# Patient Record
Sex: FEMALE | Race: BLACK OR AFRICAN AMERICAN | Employment: FULL TIME | URBAN - METROPOLITAN AREA
[De-identification: names, ages, dates, MRNs, and addresses within clinical notes are randomized per-mention and may not be internally consistent; named-entity substitution may affect disease eponyms.]

---

## 2017-02-22 ENCOUNTER — OFFICE VISIT (OUTPATIENT)
Dept: CARDIOLOGY CLINIC | Age: 47
End: 2017-02-22

## 2017-02-22 VITALS
WEIGHT: 293 LBS | HEIGHT: 66 IN | DIASTOLIC BLOOD PRESSURE: 78 MMHG | SYSTOLIC BLOOD PRESSURE: 120 MMHG | OXYGEN SATURATION: 97 % | BODY MASS INDEX: 47.09 KG/M2 | HEART RATE: 74 BPM

## 2017-02-22 DIAGNOSIS — I50.22 SYSTOLIC CHF, CHRONIC (HCC): Primary | ICD-10-CM

## 2017-02-22 DIAGNOSIS — I11.0 CARDIOMYOPATHY DUE TO HYPERTENSION, WITH HEART FAILURE (HCC): ICD-10-CM

## 2017-02-22 DIAGNOSIS — I43 CARDIOMYOPATHY DUE TO HYPERTENSION, WITH HEART FAILURE (HCC): ICD-10-CM

## 2017-02-22 DIAGNOSIS — I16.0 HYPERTENSIVE URGENCY: ICD-10-CM

## 2017-02-22 PROCEDURE — 99214 OFFICE O/P EST MOD 30 MIN: CPT | Performed by: INTERNAL MEDICINE

## 2017-02-22 RX ORDER — SPIRONOLACTONE 25 MG/1
TABLET ORAL
Qty: 90 TABLET | Refills: 11 | Status: ON HOLD | OUTPATIENT
Start: 2017-02-22 | End: 2019-01-26 | Stop reason: HOSPADM

## 2017-02-22 RX ORDER — CARVEDILOL 25 MG/1
TABLET ORAL
Qty: 60 TABLET | Refills: 11 | Status: ON HOLD | OUTPATIENT
Start: 2017-02-22 | End: 2019-01-26 | Stop reason: HOSPADM

## 2017-02-22 RX ORDER — FUROSEMIDE 40 MG/1
40 TABLET ORAL 2 TIMES DAILY
Qty: 60 TABLET | Refills: 11 | Status: ON HOLD | OUTPATIENT
Start: 2017-02-22 | End: 2019-01-26 | Stop reason: HOSPADM

## 2017-02-22 RX ORDER — VALSARTAN 320 MG/1
TABLET ORAL
Qty: 30 TABLET | Refills: 11 | Status: ON HOLD | OUTPATIENT
Start: 2017-02-22 | End: 2019-01-26 | Stop reason: HOSPADM

## 2017-07-25 RX ORDER — FUROSEMIDE 40 MG/1
TABLET ORAL
Qty: 60 TABLET | Refills: 0 | OUTPATIENT
Start: 2017-07-25

## 2017-08-22 RX ORDER — SPIRONOLACTONE 25 MG/1
TABLET ORAL
Qty: 90 TABLET | Refills: 0 | OUTPATIENT
Start: 2017-08-22

## 2019-01-21 ENCOUNTER — APPOINTMENT (OUTPATIENT)
Dept: GENERAL RADIOLOGY | Age: 49
DRG: 194 | End: 2019-01-21
Payer: COMMERCIAL

## 2019-01-21 ENCOUNTER — HOSPITAL ENCOUNTER (INPATIENT)
Age: 49
LOS: 7 days | Discharge: HOME HEALTH | DRG: 194 | End: 2019-01-28
Attending: EMERGENCY MEDICINE | Admitting: INTERNAL MEDICINE
Payer: COMMERCIAL

## 2019-01-21 DIAGNOSIS — I50.23 ACUTE ON CHRONIC SYSTOLIC CONGESTIVE HEART FAILURE (HCC): ICD-10-CM

## 2019-01-21 DIAGNOSIS — J96.01 ACUTE RESPIRATORY FAILURE WITH HYPOXEMIA (HCC): Primary | ICD-10-CM

## 2019-01-21 DIAGNOSIS — I16.0 HYPERTENSIVE URGENCY: ICD-10-CM

## 2019-01-21 DIAGNOSIS — E66.01 MORBID OBESITY (HCC): Chronic | ICD-10-CM

## 2019-01-21 DIAGNOSIS — R77.8 ELEVATED TROPONIN: ICD-10-CM

## 2019-01-21 DIAGNOSIS — I50.43 ACUTE ON CHRONIC COMBINED SYSTOLIC AND DIASTOLIC HEART FAILURE (HCC): ICD-10-CM

## 2019-01-21 DIAGNOSIS — I10 HYPERTENSION, ESSENTIAL: Chronic | ICD-10-CM

## 2019-01-21 PROBLEM — I50.33 ACUTE ON CHRONIC DIASTOLIC (CONGESTIVE) HEART FAILURE (HCC): Status: ACTIVE | Noted: 2019-01-21

## 2019-01-21 LAB
A/G RATIO: 1.2 (ref 1.1–2.2)
ALBUMIN SERPL-MCNC: 3.8 G/DL (ref 3.4–5)
ALP BLD-CCNC: 73 U/L (ref 40–129)
ALT SERPL-CCNC: 14 U/L (ref 10–40)
ANION GAP SERPL CALCULATED.3IONS-SCNC: 9 MMOL/L (ref 3–16)
AST SERPL-CCNC: 19 U/L (ref 15–37)
BACTERIA: ABNORMAL /HPF
BASOPHILS ABSOLUTE: 0 K/UL (ref 0–0.2)
BASOPHILS RELATIVE PERCENT: 0.2 %
BILIRUB SERPL-MCNC: 0.6 MG/DL (ref 0–1)
BILIRUBIN URINE: NEGATIVE
BLOOD, URINE: ABNORMAL
BUN BLDV-MCNC: 16 MG/DL (ref 7–20)
CALCIUM SERPL-MCNC: 8.9 MG/DL (ref 8.3–10.6)
CHLORIDE BLD-SCNC: 102 MMOL/L (ref 99–110)
CLARITY: CLEAR
CO2: 31 MMOL/L (ref 21–32)
COLOR: YELLOW
CREAT SERPL-MCNC: 1.4 MG/DL (ref 0.6–1.1)
EOSINOPHILS ABSOLUTE: 0.1 K/UL (ref 0–0.6)
EOSINOPHILS RELATIVE PERCENT: 1.3 %
EPITHELIAL CELLS, UA: 2 /HPF (ref 0–5)
GFR AFRICAN AMERICAN: 49
GFR NON-AFRICAN AMERICAN: 40
GLOBULIN: 3.3 G/DL
GLUCOSE BLD-MCNC: 111 MG/DL (ref 70–99)
GLUCOSE URINE: NEGATIVE MG/DL
HCG QUALITATIVE: NEGATIVE
HCT VFR BLD CALC: 43.1 % (ref 36–48)
HEMOGLOBIN: 13.1 G/DL (ref 12–16)
HYALINE CASTS: 2 /LPF (ref 0–8)
INR BLD: 1.13 (ref 0.86–1.14)
KETONES, URINE: NEGATIVE MG/DL
LEUKOCYTE ESTERASE, URINE: NEGATIVE
LIPASE: 21 U/L (ref 13–60)
LYMPHOCYTES ABSOLUTE: 2.3 K/UL (ref 1–5.1)
LYMPHOCYTES RELATIVE PERCENT: 25.1 %
MCH RBC QN AUTO: 26.1 PG (ref 26–34)
MCHC RBC AUTO-ENTMCNC: 30.4 G/DL (ref 31–36)
MCV RBC AUTO: 85.8 FL (ref 80–100)
MICROSCOPIC EXAMINATION: YES
MONOCYTES ABSOLUTE: 0.6 K/UL (ref 0–1.3)
MONOCYTES RELATIVE PERCENT: 6.7 %
NEUTROPHILS ABSOLUTE: 6.1 K/UL (ref 1.7–7.7)
NEUTROPHILS RELATIVE PERCENT: 66.7 %
NITRITE, URINE: NEGATIVE
PDW BLD-RTO: 17.6 % (ref 12.4–15.4)
PH UA: 6
PLATELET # BLD: 331 K/UL (ref 135–450)
PMV BLD AUTO: 7.7 FL (ref 5–10.5)
POTASSIUM REFLEX MAGNESIUM: 3.6 MMOL/L (ref 3.5–5.1)
PRO-BNP: 2774 PG/ML (ref 0–124)
PROTEIN UA: 100 MG/DL
PROTHROMBIN TIME: 12.9 SEC (ref 9.8–13)
RBC # BLD: 5.02 M/UL (ref 4–5.2)
RBC UA: 4 /HPF (ref 0–4)
SODIUM BLD-SCNC: 142 MMOL/L (ref 136–145)
SPECIFIC GRAVITY UA: 1.01
TOTAL PROTEIN: 7.1 G/DL (ref 6.4–8.2)
TROPONIN: 0.02 NG/ML
URINE TYPE: ABNORMAL
UROBILINOGEN, URINE: 0.2 E.U./DL
WBC # BLD: 9.2 K/UL (ref 4–11)
WBC UA: 1 /HPF (ref 0–5)

## 2019-01-21 PROCEDURE — 71046 X-RAY EXAM CHEST 2 VIEWS: CPT

## 2019-01-21 PROCEDURE — 84156 ASSAY OF PROTEIN URINE: CPT

## 2019-01-21 PROCEDURE — 2060000000 HC ICU INTERMEDIATE R&B

## 2019-01-21 PROCEDURE — 96374 THER/PROPH/DIAG INJ IV PUSH: CPT

## 2019-01-21 PROCEDURE — 81001 URINALYSIS AUTO W/SCOPE: CPT

## 2019-01-21 PROCEDURE — 85025 COMPLETE CBC W/AUTO DIFF WBC: CPT

## 2019-01-21 PROCEDURE — 93005 ELECTROCARDIOGRAM TRACING: CPT | Performed by: EMERGENCY MEDICINE

## 2019-01-21 PROCEDURE — 99284 EMERGENCY DEPT VISIT MOD MDM: CPT

## 2019-01-21 PROCEDURE — 6360000002 HC RX W HCPCS: Performed by: EMERGENCY MEDICINE

## 2019-01-21 PROCEDURE — 6370000000 HC RX 637 (ALT 250 FOR IP): Performed by: EMERGENCY MEDICINE

## 2019-01-21 PROCEDURE — 85610 PROTHROMBIN TIME: CPT

## 2019-01-21 PROCEDURE — 83880 ASSAY OF NATRIURETIC PEPTIDE: CPT

## 2019-01-21 PROCEDURE — 83690 ASSAY OF LIPASE: CPT

## 2019-01-21 PROCEDURE — 82570 ASSAY OF URINE CREATININE: CPT

## 2019-01-21 PROCEDURE — 80053 COMPREHEN METABOLIC PANEL: CPT

## 2019-01-21 PROCEDURE — 84484 ASSAY OF TROPONIN QUANT: CPT

## 2019-01-21 PROCEDURE — 82043 UR ALBUMIN QUANTITATIVE: CPT

## 2019-01-21 PROCEDURE — 84703 CHORIONIC GONADOTROPIN ASSAY: CPT

## 2019-01-21 RX ORDER — FUROSEMIDE 10 MG/ML
40 INJECTION INTRAMUSCULAR; INTRAVENOUS DAILY
Status: DISCONTINUED | OUTPATIENT
Start: 2019-01-22 | End: 2019-01-22

## 2019-01-21 RX ORDER — SPIRONOLACTONE 25 MG/1
25 TABLET ORAL DAILY
Status: DISCONTINUED | OUTPATIENT
Start: 2019-01-22 | End: 2019-01-22

## 2019-01-21 RX ORDER — ONDANSETRON 2 MG/ML
4 INJECTION INTRAMUSCULAR; INTRAVENOUS EVERY 6 HOURS PRN
Status: DISCONTINUED | OUTPATIENT
Start: 2019-01-21 | End: 2019-01-28 | Stop reason: HOSPADM

## 2019-01-21 RX ORDER — SODIUM CHLORIDE 0.9 % (FLUSH) 0.9 %
10 SYRINGE (ML) INJECTION EVERY 12 HOURS SCHEDULED
Status: DISCONTINUED | OUTPATIENT
Start: 2019-01-21 | End: 2019-01-28 | Stop reason: HOSPADM

## 2019-01-21 RX ORDER — MAGNESIUM SULFATE 1 G/100ML
1 INJECTION INTRAVENOUS PRN
Status: DISCONTINUED | OUTPATIENT
Start: 2019-01-21 | End: 2019-01-28 | Stop reason: HOSPADM

## 2019-01-21 RX ORDER — ASPIRIN 81 MG/1
324 TABLET, CHEWABLE ORAL ONCE
Status: COMPLETED | OUTPATIENT
Start: 2019-01-21 | End: 2019-01-21

## 2019-01-21 RX ORDER — CARVEDILOL 25 MG/1
25 TABLET ORAL 2 TIMES DAILY WITH MEALS
Status: DISCONTINUED | OUTPATIENT
Start: 2019-01-22 | End: 2019-01-22

## 2019-01-21 RX ORDER — SODIUM CHLORIDE 0.9 % (FLUSH) 0.9 %
10 SYRINGE (ML) INJECTION PRN
Status: DISCONTINUED | OUTPATIENT
Start: 2019-01-21 | End: 2019-01-28 | Stop reason: HOSPADM

## 2019-01-21 RX ORDER — NITROGLYCERIN 0.4 MG/1
0.4 TABLET SUBLINGUAL EVERY 5 MIN PRN
Status: DISCONTINUED | OUTPATIENT
Start: 2019-01-21 | End: 2019-01-28 | Stop reason: HOSPADM

## 2019-01-21 RX ORDER — VALSARTAN 160 MG/1
320 TABLET ORAL DAILY
Status: DISCONTINUED | OUTPATIENT
Start: 2019-01-22 | End: 2019-01-22

## 2019-01-21 RX ORDER — POTASSIUM CHLORIDE 7.45 MG/ML
10 INJECTION INTRAVENOUS PRN
Status: DISCONTINUED | OUTPATIENT
Start: 2019-01-21 | End: 2019-01-28 | Stop reason: HOSPADM

## 2019-01-21 RX ORDER — NICOTINE 21 MG/24HR
1 PATCH, TRANSDERMAL 24 HOURS TRANSDERMAL DAILY
Status: DISCONTINUED | OUTPATIENT
Start: 2019-01-22 | End: 2019-01-28 | Stop reason: HOSPADM

## 2019-01-21 RX ORDER — FUROSEMIDE 10 MG/ML
40 INJECTION INTRAMUSCULAR; INTRAVENOUS ONCE
Status: COMPLETED | OUTPATIENT
Start: 2019-01-21 | End: 2019-01-21

## 2019-01-21 RX ADMIN — ASPIRIN 81 MG 324 MG: 81 TABLET ORAL at 21:31

## 2019-01-21 RX ADMIN — FUROSEMIDE 40 MG: 10 INJECTION, SOLUTION INTRAMUSCULAR; INTRAVENOUS at 21:31

## 2019-01-22 PROBLEM — E66.01 MORBID OBESITY (HCC): Chronic | Status: ACTIVE | Noted: 2019-01-22

## 2019-01-22 PROBLEM — M79.89 SWELLING OF RIGHT LOWER EXTREMITY: Status: ACTIVE | Noted: 2019-01-22

## 2019-01-22 PROBLEM — R07.89 CHEST DISCOMFORT: Status: ACTIVE | Noted: 2019-01-22

## 2019-01-22 LAB
ANION GAP SERPL CALCULATED.3IONS-SCNC: 14 MMOL/L (ref 3–16)
BUN BLDV-MCNC: 15 MG/DL (ref 7–20)
CALCIUM SERPL-MCNC: 8.8 MG/DL (ref 8.3–10.6)
CHLORIDE BLD-SCNC: 100 MMOL/L (ref 99–110)
CO2: 25 MMOL/L (ref 21–32)
CREAT SERPL-MCNC: 1.5 MG/DL (ref 0.6–1.1)
CREATININE URINE: 83.5 MG/DL (ref 28–259)
EKG ATRIAL RATE: 102 BPM
EKG DIAGNOSIS: NORMAL
EKG P AXIS: 54 DEGREES
EKG P-R INTERVAL: 144 MS
EKG Q-T INTERVAL: 382 MS
EKG QRS DURATION: 88 MS
EKG QTC CALCULATION (BAZETT): 497 MS
EKG R AXIS: 89 DEGREES
EKG T AXIS: 231 DEGREES
EKG VENTRICULAR RATE: 102 BPM
GFR AFRICAN AMERICAN: 45
GFR NON-AFRICAN AMERICAN: 37
GLUCOSE BLD-MCNC: 120 MG/DL (ref 70–99)
LEFT VENTRICULAR EJECTION FRACTION HIGH VALUE: 35 %
LEFT VENTRICULAR EJECTION FRACTION MODE: NORMAL
LV EF: 35 %
LV EF: 35 %
LVEF MODALITY: NORMAL
MAGNESIUM: 2.1 MG/DL (ref 1.8–2.4)
MICROALBUMIN UR-MCNC: 62.7 MG/DL
MICROALBUMIN/CREAT UR-RTO: 750.9 MG/G (ref 0–30)
POTASSIUM SERPL-SCNC: 5 MMOL/L (ref 3.5–5.1)
PROTEIN PROTEIN: 99 MG/DL
SODIUM BLD-SCNC: 139 MMOL/L (ref 136–145)
TROPONIN: 0.02 NG/ML
TROPONIN: <0.01 NG/ML

## 2019-01-22 PROCEDURE — 80048 BASIC METABOLIC PNL TOTAL CA: CPT

## 2019-01-22 PROCEDURE — 36415 COLL VENOUS BLD VENIPUNCTURE: CPT

## 2019-01-22 PROCEDURE — 6360000002 HC RX W HCPCS: Performed by: INTERNAL MEDICINE

## 2019-01-22 PROCEDURE — 6370000000 HC RX 637 (ALT 250 FOR IP): Performed by: INTERNAL MEDICINE

## 2019-01-22 PROCEDURE — 84484 ASSAY OF TROPONIN QUANT: CPT

## 2019-01-22 PROCEDURE — 2060000000 HC ICU INTERMEDIATE R&B

## 2019-01-22 PROCEDURE — 2580000003 HC RX 258: Performed by: INTERNAL MEDICINE

## 2019-01-22 PROCEDURE — 83735 ASSAY OF MAGNESIUM: CPT

## 2019-01-22 PROCEDURE — 99254 IP/OBS CNSLTJ NEW/EST MOD 60: CPT | Performed by: INTERNAL MEDICINE

## 2019-01-22 PROCEDURE — 93010 ELECTROCARDIOGRAM REPORT: CPT | Performed by: INTERNAL MEDICINE

## 2019-01-22 PROCEDURE — 93306 TTE W/DOPPLER COMPLETE: CPT

## 2019-01-22 PROCEDURE — 94760 N-INVAS EAR/PLS OXIMETRY 1: CPT

## 2019-01-22 PROCEDURE — 6360000004 HC RX CONTRAST MEDICATION: Performed by: INTERNAL MEDICINE

## 2019-01-22 PROCEDURE — 2700000000 HC OXYGEN THERAPY PER DAY

## 2019-01-22 RX ORDER — FUROSEMIDE 10 MG/ML
40 INJECTION INTRAMUSCULAR; INTRAVENOUS 2 TIMES DAILY
Status: DISCONTINUED | OUTPATIENT
Start: 2019-01-22 | End: 2019-01-22

## 2019-01-22 RX ORDER — ACETAMINOPHEN 325 MG/1
650 TABLET ORAL EVERY 4 HOURS PRN
Status: DISCONTINUED | OUTPATIENT
Start: 2019-01-22 | End: 2019-01-28 | Stop reason: HOSPADM

## 2019-01-22 RX ORDER — CARVEDILOL 25 MG/1
37.5 TABLET ORAL 2 TIMES DAILY WITH MEALS
Status: DISCONTINUED | OUTPATIENT
Start: 2019-01-22 | End: 2019-01-28 | Stop reason: HOSPADM

## 2019-01-22 RX ORDER — NIFEDIPINE 30 MG/1
30 TABLET, EXTENDED RELEASE ORAL DAILY
Status: DISCONTINUED | OUTPATIENT
Start: 2019-01-22 | End: 2019-01-28 | Stop reason: HOSPADM

## 2019-01-22 RX ORDER — FUROSEMIDE 10 MG/ML
80 INJECTION INTRAMUSCULAR; INTRAVENOUS ONCE
Status: COMPLETED | OUTPATIENT
Start: 2019-01-22 | End: 2019-01-22

## 2019-01-22 RX ADMIN — FUROSEMIDE 5 MG/HR: 10 INJECTION, SOLUTION INTRAVENOUS at 13:09

## 2019-01-22 RX ADMIN — SPIRONOLACTONE 25 MG: 25 TABLET ORAL at 09:55

## 2019-01-22 RX ADMIN — PERFLUTREN 1.43 MG: 6.52 INJECTION, SUSPENSION INTRAVENOUS at 12:34

## 2019-01-22 RX ADMIN — CARVEDILOL 25 MG: 25 TABLET, FILM COATED ORAL at 09:56

## 2019-01-22 RX ADMIN — CARVEDILOL 37.5 MG: 25 TABLET, FILM COATED ORAL at 18:35

## 2019-01-22 RX ADMIN — FUROSEMIDE 80 MG: 10 INJECTION, SOLUTION INTRAMUSCULAR; INTRAVENOUS at 15:14

## 2019-01-22 RX ADMIN — NIFEDIPINE 30 MG: 30 TABLET, FILM COATED, EXTENDED RELEASE ORAL at 15:15

## 2019-01-22 RX ADMIN — Medication 10 ML: at 09:56

## 2019-01-22 RX ADMIN — ACETAMINOPHEN 650 MG: 325 TABLET, FILM COATED ORAL at 11:32

## 2019-01-22 RX ADMIN — Medication 10 ML: at 00:06

## 2019-01-22 RX ADMIN — ENOXAPARIN SODIUM 40 MG: 40 INJECTION SUBCUTANEOUS at 10:25

## 2019-01-22 RX ADMIN — FUROSEMIDE 40 MG: 10 INJECTION, SOLUTION INTRAVENOUS at 10:03

## 2019-01-22 ASSESSMENT — ENCOUNTER SYMPTOMS
DIARRHEA: 0
COUGH: 0
BLOOD IN STOOL: 0
ABDOMINAL PAIN: 0
NAUSEA: 0
CONSTIPATION: 0
SHORTNESS OF BREATH: 1
WHEEZING: 0
EYE DISCHARGE: 0
SORE THROAT: 0
EYE PAIN: 0
VOMITING: 0

## 2019-01-22 ASSESSMENT — PAIN SCALES - GENERAL
PAINLEVEL_OUTOF10: 0
PAINLEVEL_OUTOF10: 8
PAINLEVEL_OUTOF10: 0

## 2019-01-23 ENCOUNTER — APPOINTMENT (OUTPATIENT)
Dept: CT IMAGING | Age: 49
DRG: 194 | End: 2019-01-23
Payer: COMMERCIAL

## 2019-01-23 PROBLEM — I50.43 ACUTE ON CHRONIC COMBINED SYSTOLIC AND DIASTOLIC HEART FAILURE (HCC): Status: ACTIVE | Noted: 2019-01-21

## 2019-01-23 LAB
ALBUMIN SERPL-MCNC: 3.3 G/DL (ref 3.4–5)
ANION GAP SERPL CALCULATED.3IONS-SCNC: 10 MMOL/L (ref 3–16)
BACTERIA: ABNORMAL /HPF
BASOPHILS ABSOLUTE: 0 K/UL (ref 0–0.2)
BASOPHILS RELATIVE PERCENT: 0.5 %
BILIRUBIN URINE: NEGATIVE
BLOOD, URINE: ABNORMAL
BUN BLDV-MCNC: 18 MG/DL (ref 7–20)
C3 COMPLEMENT: 138.7 MG/DL (ref 90–180)
C4 COMPLEMENT: 25.9 MG/DL (ref 10–40)
CALCIUM SERPL-MCNC: 8.6 MG/DL (ref 8.3–10.6)
CHLORIDE BLD-SCNC: 99 MMOL/L (ref 99–110)
CLARITY: ABNORMAL
CO2: 31 MMOL/L (ref 21–32)
COLOR: YELLOW
CREAT SERPL-MCNC: 1.6 MG/DL (ref 0.6–1.1)
EOSINOPHILS ABSOLUTE: 0.1 K/UL (ref 0–0.6)
EOSINOPHILS RELATIVE PERCENT: 1.3 %
EPITHELIAL CELLS, UA: 6 /HPF (ref 0–5)
ESTIMATED AVERAGE GLUCOSE: 111.2 MG/DL
GFR AFRICAN AMERICAN: 42
GFR NON-AFRICAN AMERICAN: 35
GLUCOSE BLD-MCNC: 144 MG/DL (ref 70–99)
GLUCOSE URINE: NEGATIVE MG/DL
HBA1C MFR BLD: 5.5 %
HCT VFR BLD CALC: 42.3 % (ref 36–48)
HEMOGLOBIN: 12.7 G/DL (ref 12–16)
HYALINE CASTS: 4 /LPF (ref 0–8)
KETONES, URINE: NEGATIVE MG/DL
LEUKOCYTE ESTERASE, URINE: NEGATIVE
LYMPHOCYTES ABSOLUTE: 1.6 K/UL (ref 1–5.1)
LYMPHOCYTES RELATIVE PERCENT: 20.6 %
MAGNESIUM: 2.1 MG/DL (ref 1.8–2.4)
MCH RBC QN AUTO: 26 PG (ref 26–34)
MCHC RBC AUTO-ENTMCNC: 30 G/DL (ref 31–36)
MCV RBC AUTO: 86.7 FL (ref 80–100)
MICROSCOPIC EXAMINATION: YES
MONOCYTES ABSOLUTE: 0.5 K/UL (ref 0–1.3)
MONOCYTES RELATIVE PERCENT: 6.3 %
NEUTROPHILS ABSOLUTE: 5.7 K/UL (ref 1.7–7.7)
NEUTROPHILS RELATIVE PERCENT: 71.3 %
NITRITE, URINE: NEGATIVE
PDW BLD-RTO: 18.1 % (ref 12.4–15.4)
PH UA: 5
PHOSPHORUS: 5.5 MG/DL (ref 2.5–4.9)
PLATELET # BLD: 332 K/UL (ref 135–450)
PMV BLD AUTO: 7.7 FL (ref 5–10.5)
POTASSIUM SERPL-SCNC: 4 MMOL/L (ref 3.5–5.1)
PROTEIN UA: ABNORMAL MG/DL
RBC # BLD: 4.88 M/UL (ref 4–5.2)
RBC UA: 2 /HPF (ref 0–4)
SODIUM BLD-SCNC: 140 MMOL/L (ref 136–145)
SPECIFIC GRAVITY UA: 1.01
TOTAL CK: 160 U/L (ref 26–192)
URINE TYPE: ABNORMAL
UROBILINOGEN, URINE: 0.2 E.U./DL
WBC # BLD: 8 K/UL (ref 4–11)
WBC UA: 2 /HPF (ref 0–5)

## 2019-01-23 PROCEDURE — 86039 ANTINUCLEAR ANTIBODIES (ANA): CPT

## 2019-01-23 PROCEDURE — 94760 N-INVAS EAR/PLS OXIMETRY 1: CPT

## 2019-01-23 PROCEDURE — 36415 COLL VENOUS BLD VENIPUNCTURE: CPT

## 2019-01-23 PROCEDURE — 74176 CT ABD & PELVIS W/O CONTRAST: CPT

## 2019-01-23 PROCEDURE — 85025 COMPLETE CBC W/AUTO DIFF WBC: CPT

## 2019-01-23 PROCEDURE — 6360000002 HC RX W HCPCS: Performed by: INTERNAL MEDICINE

## 2019-01-23 PROCEDURE — 2060000000 HC ICU INTERMEDIATE R&B

## 2019-01-23 PROCEDURE — 86160 COMPLEMENT ANTIGEN: CPT

## 2019-01-23 PROCEDURE — 6370000000 HC RX 637 (ALT 250 FOR IP): Performed by: INTERNAL MEDICINE

## 2019-01-23 PROCEDURE — 2700000000 HC OXYGEN THERAPY PER DAY

## 2019-01-23 PROCEDURE — 2580000003 HC RX 258: Performed by: INTERNAL MEDICINE

## 2019-01-23 PROCEDURE — 83735 ASSAY OF MAGNESIUM: CPT

## 2019-01-23 PROCEDURE — 99232 SBSQ HOSP IP/OBS MODERATE 35: CPT | Performed by: NURSE PRACTITIONER

## 2019-01-23 PROCEDURE — 83036 HEMOGLOBIN GLYCOSYLATED A1C: CPT

## 2019-01-23 PROCEDURE — 86255 FLUORESCENT ANTIBODY SCREEN: CPT

## 2019-01-23 PROCEDURE — 93971 EXTREMITY STUDY: CPT

## 2019-01-23 PROCEDURE — 86038 ANTINUCLEAR ANTIBODIES: CPT

## 2019-01-23 PROCEDURE — 81001 URINALYSIS AUTO W/SCOPE: CPT

## 2019-01-23 PROCEDURE — 80069 RENAL FUNCTION PANEL: CPT

## 2019-01-23 PROCEDURE — 82550 ASSAY OF CK (CPK): CPT

## 2019-01-23 RX ADMIN — ENOXAPARIN SODIUM 40 MG: 40 INJECTION SUBCUTANEOUS at 00:07

## 2019-01-23 RX ADMIN — CARVEDILOL 37.5 MG: 25 TABLET, FILM COATED ORAL at 17:28

## 2019-01-23 RX ADMIN — ACETAMINOPHEN 650 MG: 325 TABLET, FILM COATED ORAL at 14:38

## 2019-01-23 RX ADMIN — NIFEDIPINE 30 MG: 30 TABLET, FILM COATED, EXTENDED RELEASE ORAL at 09:21

## 2019-01-23 RX ADMIN — ENOXAPARIN SODIUM 40 MG: 40 INJECTION SUBCUTANEOUS at 20:54

## 2019-01-23 RX ADMIN — Medication 10 ML: at 00:07

## 2019-01-23 RX ADMIN — CARVEDILOL 37.5 MG: 25 TABLET, FILM COATED ORAL at 09:21

## 2019-01-23 ASSESSMENT — PAIN SCALES - GENERAL
PAINLEVEL_OUTOF10: 0
PAINLEVEL_OUTOF10: 0
PAINLEVEL_OUTOF10: 7
PAINLEVEL_OUTOF10: 0

## 2019-01-24 LAB
ALBUMIN SERPL-MCNC: 3.3 G/DL (ref 3.4–5)
ANA INTERPRETATION: ABNORMAL
ANA PATTERN: ABNORMAL
ANA TITER: ABNORMAL
ANION GAP SERPL CALCULATED.3IONS-SCNC: 6 MMOL/L (ref 3–16)
ANTI-NUCLEAR ANTIBODY (ANA): POSITIVE
BASOPHILS ABSOLUTE: 0 K/UL (ref 0–0.2)
BASOPHILS RELATIVE PERCENT: 0.2 %
BUN BLDV-MCNC: 19 MG/DL (ref 7–20)
CALCIUM SERPL-MCNC: 8.8 MG/DL (ref 8.3–10.6)
CHLORIDE BLD-SCNC: 100 MMOL/L (ref 99–110)
CO2: 37 MMOL/L (ref 21–32)
CREAT SERPL-MCNC: 1.6 MG/DL (ref 0.6–1.1)
EOSINOPHILS ABSOLUTE: 0.1 K/UL (ref 0–0.6)
EOSINOPHILS RELATIVE PERCENT: 1.6 %
GFR AFRICAN AMERICAN: 42
GFR NON-AFRICAN AMERICAN: 35
GLUCOSE BLD-MCNC: 117 MG/DL (ref 70–99)
HCT VFR BLD CALC: 41.6 % (ref 36–48)
HEMOGLOBIN: 12.9 G/DL (ref 12–16)
LYMPHOCYTES ABSOLUTE: 2.3 K/UL (ref 1–5.1)
LYMPHOCYTES RELATIVE PERCENT: 28.6 %
MAGNESIUM: 2.1 MG/DL (ref 1.8–2.4)
MCH RBC QN AUTO: 26.8 PG (ref 26–34)
MCHC RBC AUTO-ENTMCNC: 31 G/DL (ref 31–36)
MCV RBC AUTO: 86.3 FL (ref 80–100)
MONOCYTES ABSOLUTE: 0.6 K/UL (ref 0–1.3)
MONOCYTES RELATIVE PERCENT: 7.6 %
NEUTROPHILS ABSOLUTE: 5.1 K/UL (ref 1.7–7.7)
NEUTROPHILS RELATIVE PERCENT: 62 %
PDW BLD-RTO: 18.1 % (ref 12.4–15.4)
PHOSPHORUS: 5.3 MG/DL (ref 2.5–4.9)
PLATELET # BLD: 323 K/UL (ref 135–450)
PMV BLD AUTO: 8 FL (ref 5–10.5)
POTASSIUM SERPL-SCNC: 3.8 MMOL/L (ref 3.5–5.1)
PRO-BNP: 406 PG/ML (ref 0–124)
RBC # BLD: 4.82 M/UL (ref 4–5.2)
SODIUM BLD-SCNC: 143 MMOL/L (ref 136–145)
WBC # BLD: 8.2 K/UL (ref 4–11)

## 2019-01-24 PROCEDURE — 2580000003 HC RX 258: Performed by: INTERNAL MEDICINE

## 2019-01-24 PROCEDURE — 83735 ASSAY OF MAGNESIUM: CPT

## 2019-01-24 PROCEDURE — 6370000000 HC RX 637 (ALT 250 FOR IP): Performed by: INTERNAL MEDICINE

## 2019-01-24 PROCEDURE — 80069 RENAL FUNCTION PANEL: CPT

## 2019-01-24 PROCEDURE — 6360000002 HC RX W HCPCS: Performed by: INTERNAL MEDICINE

## 2019-01-24 PROCEDURE — 2060000000 HC ICU INTERMEDIATE R&B

## 2019-01-24 PROCEDURE — 99232 SBSQ HOSP IP/OBS MODERATE 35: CPT | Performed by: NURSE PRACTITIONER

## 2019-01-24 PROCEDURE — 36415 COLL VENOUS BLD VENIPUNCTURE: CPT

## 2019-01-24 PROCEDURE — 85025 COMPLETE CBC W/AUTO DIFF WBC: CPT

## 2019-01-24 PROCEDURE — 83880 ASSAY OF NATRIURETIC PEPTIDE: CPT

## 2019-01-24 RX ORDER — ACETAMINOPHEN 80 MG
TABLET,CHEWABLE ORAL
Status: COMPLETED
Start: 2019-01-24 | End: 2019-01-24

## 2019-01-24 RX ADMIN — CARVEDILOL 37.5 MG: 25 TABLET, FILM COATED ORAL at 10:04

## 2019-01-24 RX ADMIN — ENOXAPARIN SODIUM 40 MG: 40 INJECTION SUBCUTANEOUS at 22:32

## 2019-01-24 RX ADMIN — Medication: at 10:30

## 2019-01-24 RX ADMIN — NIFEDIPINE 30 MG: 30 TABLET, FILM COATED, EXTENDED RELEASE ORAL at 10:16

## 2019-01-24 RX ADMIN — Medication 10 ML: at 10:06

## 2019-01-24 RX ADMIN — CARVEDILOL 37.5 MG: 25 TABLET, FILM COATED ORAL at 17:43

## 2019-01-24 RX ADMIN — Medication 10 ML: at 22:33

## 2019-01-24 ASSESSMENT — PAIN SCALES - GENERAL: PAINLEVEL_OUTOF10: 0

## 2019-01-25 ENCOUNTER — APPOINTMENT (OUTPATIENT)
Dept: CT IMAGING | Age: 49
DRG: 194 | End: 2019-01-25
Payer: COMMERCIAL

## 2019-01-25 LAB
ALBUMIN SERPL-MCNC: 3.4 G/DL (ref 3.4–5)
ANION GAP SERPL CALCULATED.3IONS-SCNC: 8 MMOL/L (ref 3–16)
BASOPHILS ABSOLUTE: 0 K/UL (ref 0–0.2)
BASOPHILS RELATIVE PERCENT: 0.3 %
BUN BLDV-MCNC: 18 MG/DL (ref 7–20)
CALCIUM SERPL-MCNC: 8.6 MG/DL (ref 8.3–10.6)
CHLORIDE BLD-SCNC: 100 MMOL/L (ref 99–110)
CO2: 36 MMOL/L (ref 21–32)
CREAT SERPL-MCNC: 1.5 MG/DL (ref 0.6–1.1)
EOSINOPHILS ABSOLUTE: 0.2 K/UL (ref 0–0.6)
EOSINOPHILS RELATIVE PERCENT: 2 %
GFR AFRICAN AMERICAN: 45
GFR NON-AFRICAN AMERICAN: 37
GLUCOSE BLD-MCNC: 143 MG/DL (ref 70–99)
HCT VFR BLD CALC: 42.9 % (ref 36–48)
HEMOGLOBIN: 13.2 G/DL (ref 12–16)
LYMPHOCYTES ABSOLUTE: 2 K/UL (ref 1–5.1)
LYMPHOCYTES RELATIVE PERCENT: 26.3 %
MAGNESIUM: 2.2 MG/DL (ref 1.8–2.4)
MCH RBC QN AUTO: 26.3 PG (ref 26–34)
MCHC RBC AUTO-ENTMCNC: 30.7 G/DL (ref 31–36)
MCV RBC AUTO: 85.6 FL (ref 80–100)
MONOCYTES ABSOLUTE: 0.7 K/UL (ref 0–1.3)
MONOCYTES RELATIVE PERCENT: 8.7 %
NEUTROPHILS ABSOLUTE: 4.8 K/UL (ref 1.7–7.7)
NEUTROPHILS RELATIVE PERCENT: 62.7 %
PDW BLD-RTO: 17.9 % (ref 12.4–15.4)
PHOSPHORUS: 4.7 MG/DL (ref 2.5–4.9)
PLATELET # BLD: 347 K/UL (ref 135–450)
PMV BLD AUTO: 7.3 FL (ref 5–10.5)
POTASSIUM SERPL-SCNC: 3.9 MMOL/L (ref 3.5–5.1)
RBC # BLD: 5.01 M/UL (ref 4–5.2)
SODIUM BLD-SCNC: 144 MMOL/L (ref 136–145)
WBC # BLD: 7.7 K/UL (ref 4–11)

## 2019-01-25 PROCEDURE — 86225 DNA ANTIBODY NATIVE: CPT

## 2019-01-25 PROCEDURE — 2700000000 HC OXYGEN THERAPY PER DAY

## 2019-01-25 PROCEDURE — 2060000000 HC ICU INTERMEDIATE R&B

## 2019-01-25 PROCEDURE — 6360000002 HC RX W HCPCS: Performed by: INTERNAL MEDICINE

## 2019-01-25 PROCEDURE — 6370000000 HC RX 637 (ALT 250 FOR IP): Performed by: INTERNAL MEDICINE

## 2019-01-25 PROCEDURE — 2580000003 HC RX 258: Performed by: INTERNAL MEDICINE

## 2019-01-25 PROCEDURE — 80069 RENAL FUNCTION PANEL: CPT

## 2019-01-25 PROCEDURE — 85025 COMPLETE CBC W/AUTO DIFF WBC: CPT

## 2019-01-25 PROCEDURE — 71250 CT THORAX DX C-: CPT

## 2019-01-25 PROCEDURE — 83735 ASSAY OF MAGNESIUM: CPT

## 2019-01-25 PROCEDURE — 99232 SBSQ HOSP IP/OBS MODERATE 35: CPT | Performed by: NURSE PRACTITIONER

## 2019-01-25 PROCEDURE — 36415 COLL VENOUS BLD VENIPUNCTURE: CPT

## 2019-01-25 PROCEDURE — 86235 NUCLEAR ANTIGEN ANTIBODY: CPT

## 2019-01-25 PROCEDURE — 94760 N-INVAS EAR/PLS OXIMETRY 1: CPT

## 2019-01-25 RX ADMIN — FUROSEMIDE 5 MG/HR: 10 INJECTION, SOLUTION INTRAVENOUS at 09:23

## 2019-01-25 RX ADMIN — CARVEDILOL 37.5 MG: 25 TABLET, FILM COATED ORAL at 09:21

## 2019-01-25 RX ADMIN — NIFEDIPINE 30 MG: 30 TABLET, FILM COATED, EXTENDED RELEASE ORAL at 09:21

## 2019-01-25 RX ADMIN — ENOXAPARIN SODIUM 40 MG: 40 INJECTION SUBCUTANEOUS at 22:47

## 2019-01-25 RX ADMIN — CARVEDILOL 37.5 MG: 25 TABLET, FILM COATED ORAL at 18:04

## 2019-01-25 RX ADMIN — Medication 10 ML: at 09:21

## 2019-01-25 RX ADMIN — Medication 10 ML: at 22:48

## 2019-01-25 ASSESSMENT — PAIN SCALES - GENERAL
PAINLEVEL_OUTOF10: 0
PAINLEVEL_OUTOF10: 0

## 2019-01-26 LAB
ALBUMIN SERPL-MCNC: 3.5 G/DL (ref 3.4–5)
ANION GAP SERPL CALCULATED.3IONS-SCNC: 9 MMOL/L (ref 3–16)
BASOPHILS ABSOLUTE: 0 K/UL (ref 0–0.2)
BASOPHILS RELATIVE PERCENT: 0.5 %
BUN BLDV-MCNC: 18 MG/DL (ref 7–20)
CALCIUM SERPL-MCNC: 9.2 MG/DL (ref 8.3–10.6)
CHLORIDE BLD-SCNC: 95 MMOL/L (ref 99–110)
CO2: 37 MMOL/L (ref 21–32)
CREAT SERPL-MCNC: 1.6 MG/DL (ref 0.6–1.1)
EOSINOPHILS ABSOLUTE: 0.2 K/UL (ref 0–0.6)
EOSINOPHILS RELATIVE PERCENT: 2.1 %
GFR AFRICAN AMERICAN: 42
GFR NON-AFRICAN AMERICAN: 35
GLUCOSE BLD-MCNC: 128 MG/DL (ref 70–99)
HCT VFR BLD CALC: 42.1 % (ref 36–48)
HEMOGLOBIN: 13.1 G/DL (ref 12–16)
LYMPHOCYTES ABSOLUTE: 2 K/UL (ref 1–5.1)
LYMPHOCYTES RELATIVE PERCENT: 26.8 %
MAGNESIUM: 2.2 MG/DL (ref 1.8–2.4)
MCH RBC QN AUTO: 26.5 PG (ref 26–34)
MCHC RBC AUTO-ENTMCNC: 31.1 G/DL (ref 31–36)
MCV RBC AUTO: 85.2 FL (ref 80–100)
MONOCYTES ABSOLUTE: 0.6 K/UL (ref 0–1.3)
MONOCYTES RELATIVE PERCENT: 8.8 %
NEUTROPHILS ABSOLUTE: 4.5 K/UL (ref 1.7–7.7)
NEUTROPHILS RELATIVE PERCENT: 61.8 %
PDW BLD-RTO: 18.1 % (ref 12.4–15.4)
PHOSPHORUS: 4.8 MG/DL (ref 2.5–4.9)
PLATELET # BLD: 342 K/UL (ref 135–450)
PMV BLD AUTO: 7.5 FL (ref 5–10.5)
POTASSIUM SERPL-SCNC: 3.5 MMOL/L (ref 3.5–5.1)
RBC # BLD: 4.94 M/UL (ref 4–5.2)
SODIUM BLD-SCNC: 141 MMOL/L (ref 136–145)
WBC # BLD: 7.3 K/UL (ref 4–11)

## 2019-01-26 PROCEDURE — 85025 COMPLETE CBC W/AUTO DIFF WBC: CPT

## 2019-01-26 PROCEDURE — 83735 ASSAY OF MAGNESIUM: CPT

## 2019-01-26 PROCEDURE — 99232 SBSQ HOSP IP/OBS MODERATE 35: CPT | Performed by: CLINICAL NURSE SPECIALIST

## 2019-01-26 PROCEDURE — 2060000000 HC ICU INTERMEDIATE R&B

## 2019-01-26 PROCEDURE — 80069 RENAL FUNCTION PANEL: CPT

## 2019-01-26 PROCEDURE — 36415 COLL VENOUS BLD VENIPUNCTURE: CPT

## 2019-01-26 PROCEDURE — 2580000003 HC RX 258: Performed by: INTERNAL MEDICINE

## 2019-01-26 PROCEDURE — 94762 N-INVAS EAR/PLS OXIMTRY CONT: CPT

## 2019-01-26 PROCEDURE — 6370000000 HC RX 637 (ALT 250 FOR IP): Performed by: CLINICAL NURSE SPECIALIST

## 2019-01-26 PROCEDURE — 97165 OT EVAL LOW COMPLEX 30 MIN: CPT

## 2019-01-26 PROCEDURE — 6370000000 HC RX 637 (ALT 250 FOR IP): Performed by: INTERNAL MEDICINE

## 2019-01-26 PROCEDURE — 97161 PT EVAL LOW COMPLEX 20 MIN: CPT

## 2019-01-26 PROCEDURE — 6360000002 HC RX W HCPCS: Performed by: INTERNAL MEDICINE

## 2019-01-26 PROCEDURE — 94760 N-INVAS EAR/PLS OXIMETRY 1: CPT

## 2019-01-26 RX ORDER — FUROSEMIDE 10 MG/ML
20 INJECTION INTRAMUSCULAR; INTRAVENOUS 2 TIMES DAILY
Status: DISCONTINUED | OUTPATIENT
Start: 2019-01-26 | End: 2019-01-27

## 2019-01-26 RX ORDER — TORSEMIDE 20 MG/1
40 TABLET ORAL DAILY
Status: DISCONTINUED | OUTPATIENT
Start: 2019-01-26 | End: 2019-01-26

## 2019-01-26 RX ORDER — VALSARTAN 80 MG/1
80 TABLET ORAL DAILY
Status: DISCONTINUED | OUTPATIENT
Start: 2019-01-26 | End: 2019-01-26

## 2019-01-26 RX ORDER — TORSEMIDE 20 MG/1
40 TABLET ORAL DAILY
Qty: 30 TABLET | Refills: 3 | Status: SHIPPED | OUTPATIENT
Start: 2019-01-26 | End: 2020-01-31 | Stop reason: SDUPTHER

## 2019-01-26 RX ORDER — NICOTINE 21 MG/24HR
1 PATCH, TRANSDERMAL 24 HOURS TRANSDERMAL DAILY
Qty: 30 PATCH | Refills: 3 | Status: SHIPPED | OUTPATIENT
Start: 2019-01-27 | End: 2019-02-01 | Stop reason: CLARIF

## 2019-01-26 RX ORDER — CARVEDILOL 12.5 MG/1
37.5 TABLET ORAL 2 TIMES DAILY WITH MEALS
Qty: 60 TABLET | Refills: 3 | Status: SHIPPED | OUTPATIENT
Start: 2019-01-26 | End: 2019-03-01 | Stop reason: SDUPTHER

## 2019-01-26 RX ORDER — NIFEDIPINE 30 MG/1
30 TABLET, FILM COATED, EXTENDED RELEASE ORAL DAILY
Qty: 30 TABLET | Refills: 3 | Status: SHIPPED | OUTPATIENT
Start: 2019-01-27 | End: 2019-03-01

## 2019-01-26 RX ADMIN — CARVEDILOL 37.5 MG: 25 TABLET, FILM COATED ORAL at 10:22

## 2019-01-26 RX ADMIN — Medication 10 ML: at 22:50

## 2019-01-26 RX ADMIN — NIFEDIPINE 30 MG: 30 TABLET, FILM COATED, EXTENDED RELEASE ORAL at 10:23

## 2019-01-26 RX ADMIN — TORSEMIDE 40 MG: 20 TABLET ORAL at 14:37

## 2019-01-26 RX ADMIN — CARVEDILOL 37.5 MG: 25 TABLET, FILM COATED ORAL at 18:36

## 2019-01-26 RX ADMIN — FUROSEMIDE 20 MG: 10 INJECTION, SOLUTION INTRAVENOUS at 18:36

## 2019-01-26 ASSESSMENT — PAIN SCALES - GENERAL
PAINLEVEL_OUTOF10: 0
PAINLEVEL_OUTOF10: 0

## 2019-01-27 LAB
ALBUMIN SERPL-MCNC: 3.3 G/DL (ref 3.4–5)
ANION GAP SERPL CALCULATED.3IONS-SCNC: 10 MMOL/L (ref 3–16)
BASOPHILS ABSOLUTE: 0.1 K/UL (ref 0–0.2)
BASOPHILS RELATIVE PERCENT: 0.7 %
BUN BLDV-MCNC: 20 MG/DL (ref 7–20)
CALCIUM SERPL-MCNC: 9.3 MG/DL (ref 8.3–10.6)
CHLORIDE BLD-SCNC: 97 MMOL/L (ref 99–110)
CO2: 34 MMOL/L (ref 21–32)
CREAT SERPL-MCNC: 1.4 MG/DL (ref 0.6–1.1)
EOSINOPHILS ABSOLUTE: 0.1 K/UL (ref 0–0.6)
EOSINOPHILS RELATIVE PERCENT: 1.9 %
GFR AFRICAN AMERICAN: 49
GFR NON-AFRICAN AMERICAN: 40
GLUCOSE BLD-MCNC: 127 MG/DL (ref 70–99)
HCT VFR BLD CALC: 42.3 % (ref 36–48)
HEMOGLOBIN: 13.3 G/DL (ref 12–16)
LYMPHOCYTES ABSOLUTE: 1.8 K/UL (ref 1–5.1)
LYMPHOCYTES RELATIVE PERCENT: 23.6 %
MAGNESIUM: 2.3 MG/DL (ref 1.8–2.4)
MCH RBC QN AUTO: 26.8 PG (ref 26–34)
MCHC RBC AUTO-ENTMCNC: 31.4 G/DL (ref 31–36)
MCV RBC AUTO: 85.2 FL (ref 80–100)
MONOCYTES ABSOLUTE: 0.6 K/UL (ref 0–1.3)
MONOCYTES RELATIVE PERCENT: 7.5 %
NEUTROPHILS ABSOLUTE: 5.1 K/UL (ref 1.7–7.7)
NEUTROPHILS RELATIVE PERCENT: 66.3 %
PDW BLD-RTO: 17.9 % (ref 12.4–15.4)
PHOSPHORUS: 5.6 MG/DL (ref 2.5–4.9)
PLATELET # BLD: 317 K/UL (ref 135–450)
PMV BLD AUTO: 7.5 FL (ref 5–10.5)
POTASSIUM SERPL-SCNC: 3.7 MMOL/L (ref 3.5–5.1)
RBC # BLD: 4.96 M/UL (ref 4–5.2)
SODIUM BLD-SCNC: 141 MMOL/L (ref 136–145)
WBC # BLD: 7.6 K/UL (ref 4–11)

## 2019-01-27 PROCEDURE — 6370000000 HC RX 637 (ALT 250 FOR IP): Performed by: INTERNAL MEDICINE

## 2019-01-27 PROCEDURE — 85025 COMPLETE CBC W/AUTO DIFF WBC: CPT

## 2019-01-27 PROCEDURE — 36415 COLL VENOUS BLD VENIPUNCTURE: CPT

## 2019-01-27 PROCEDURE — 2580000003 HC RX 258: Performed by: INTERNAL MEDICINE

## 2019-01-27 PROCEDURE — 2060000000 HC ICU INTERMEDIATE R&B

## 2019-01-27 PROCEDURE — 83735 ASSAY OF MAGNESIUM: CPT

## 2019-01-27 PROCEDURE — 6360000002 HC RX W HCPCS: Performed by: CLINICAL NURSE SPECIALIST

## 2019-01-27 PROCEDURE — 6360000002 HC RX W HCPCS: Performed by: INTERNAL MEDICINE

## 2019-01-27 PROCEDURE — 80069 RENAL FUNCTION PANEL: CPT

## 2019-01-27 PROCEDURE — 99232 SBSQ HOSP IP/OBS MODERATE 35: CPT | Performed by: CLINICAL NURSE SPECIALIST

## 2019-01-27 RX ORDER — FUROSEMIDE 10 MG/ML
40 INJECTION INTRAMUSCULAR; INTRAVENOUS 2 TIMES DAILY
Status: DISCONTINUED | OUTPATIENT
Start: 2019-01-27 | End: 2019-01-27

## 2019-01-27 RX ORDER — FUROSEMIDE 10 MG/ML
20 INJECTION INTRAMUSCULAR; INTRAVENOUS 2 TIMES DAILY
Status: DISCONTINUED | OUTPATIENT
Start: 2019-01-27 | End: 2019-01-27

## 2019-01-27 RX ORDER — FUROSEMIDE 10 MG/ML
20 INJECTION INTRAMUSCULAR; INTRAVENOUS 2 TIMES DAILY
Status: COMPLETED | OUTPATIENT
Start: 2019-01-27 | End: 2019-01-27

## 2019-01-27 RX ORDER — TORSEMIDE 20 MG/1
40 TABLET ORAL DAILY
Status: DISCONTINUED | OUTPATIENT
Start: 2019-01-28 | End: 2019-01-28 | Stop reason: HOSPADM

## 2019-01-27 RX ORDER — FUROSEMIDE 10 MG/ML
INJECTION INTRAMUSCULAR; INTRAVENOUS
Status: DISPENSED
Start: 2019-01-27 | End: 2019-01-27

## 2019-01-27 RX ORDER — VALSARTAN 160 MG/1
160 TABLET ORAL DAILY
Status: DISCONTINUED | OUTPATIENT
Start: 2019-01-28 | End: 2019-01-28 | Stop reason: HOSPADM

## 2019-01-27 RX ADMIN — CARVEDILOL 37.5 MG: 25 TABLET, FILM COATED ORAL at 10:10

## 2019-01-27 RX ADMIN — NIFEDIPINE 30 MG: 30 TABLET, FILM COATED, EXTENDED RELEASE ORAL at 10:00

## 2019-01-27 RX ADMIN — Medication 10 ML: at 21:36

## 2019-01-27 RX ADMIN — FUROSEMIDE 20 MG: 10 INJECTION, SOLUTION INTRAVENOUS at 11:01

## 2019-01-27 RX ADMIN — ENOXAPARIN SODIUM 40 MG: 40 INJECTION SUBCUTANEOUS at 21:36

## 2019-01-27 RX ADMIN — CARVEDILOL 37.5 MG: 25 TABLET, FILM COATED ORAL at 17:53

## 2019-01-27 RX ADMIN — Medication 10 ML: at 10:06

## 2019-01-27 RX ADMIN — FUROSEMIDE 20 MG: 10 INJECTION, SOLUTION INTRAVENOUS at 17:54

## 2019-01-28 VITALS
TEMPERATURE: 97.7 F | DIASTOLIC BLOOD PRESSURE: 72 MMHG | HEART RATE: 76 BPM | WEIGHT: 293 LBS | HEIGHT: 65 IN | BODY MASS INDEX: 48.82 KG/M2 | RESPIRATION RATE: 16 BRPM | OXYGEN SATURATION: 93 % | SYSTOLIC BLOOD PRESSURE: 113 MMHG

## 2019-01-28 LAB
ALBUMIN SERPL-MCNC: 3.4 G/DL (ref 3.4–5)
ANCA IFA: NORMAL
ANION GAP SERPL CALCULATED.3IONS-SCNC: 10 MMOL/L (ref 3–16)
ANTI-DSDNA IGG: 12 IU/ML (ref 0–9)
ANTI-RNP IGG: 1.4 AI (ref 0–0.9)
ANTI-SMITH IGG: <0.2 AI (ref 0–0.9)
BASOPHILS ABSOLUTE: 0 K/UL (ref 0–0.2)
BASOPHILS RELATIVE PERCENT: 0.2 %
BUN BLDV-MCNC: 20 MG/DL (ref 7–20)
CALCIUM SERPL-MCNC: 9.2 MG/DL (ref 8.3–10.6)
CHLORIDE BLD-SCNC: 97 MMOL/L (ref 99–110)
CO2: 32 MMOL/L (ref 21–32)
CREAT SERPL-MCNC: 1.3 MG/DL (ref 0.6–1.1)
EOSINOPHILS ABSOLUTE: 0.2 K/UL (ref 0–0.6)
EOSINOPHILS RELATIVE PERCENT: 2.2 %
GFR AFRICAN AMERICAN: 53
GFR NON-AFRICAN AMERICAN: 44
GLUCOSE BLD-MCNC: 124 MG/DL (ref 70–99)
HCT VFR BLD CALC: 42.8 % (ref 36–48)
HEMOGLOBIN: 13.2 G/DL (ref 12–16)
LYMPHOCYTES ABSOLUTE: 2 K/UL (ref 1–5.1)
LYMPHOCYTES RELATIVE PERCENT: 26 %
MAGNESIUM: 2.5 MG/DL (ref 1.8–2.4)
MCH RBC QN AUTO: 26.6 PG (ref 26–34)
MCHC RBC AUTO-ENTMCNC: 30.9 G/DL (ref 31–36)
MCV RBC AUTO: 86.2 FL (ref 80–100)
MONOCYTES ABSOLUTE: 0.7 K/UL (ref 0–1.3)
MONOCYTES RELATIVE PERCENT: 8.6 %
NEUTROPHILS ABSOLUTE: 4.9 K/UL (ref 1.7–7.7)
NEUTROPHILS RELATIVE PERCENT: 63 %
PDW BLD-RTO: 18.2 % (ref 12.4–15.4)
PHOSPHORUS: 5.1 MG/DL (ref 2.5–4.9)
PLATELET # BLD: 329 K/UL (ref 135–450)
PMV BLD AUTO: 7.7 FL (ref 5–10.5)
POTASSIUM SERPL-SCNC: 3.8 MMOL/L (ref 3.5–5.1)
RBC # BLD: 4.96 M/UL (ref 4–5.2)
SODIUM BLD-SCNC: 139 MMOL/L (ref 136–145)
WBC # BLD: 7.7 K/UL (ref 4–11)

## 2019-01-28 PROCEDURE — 83735 ASSAY OF MAGNESIUM: CPT

## 2019-01-28 PROCEDURE — 6370000000 HC RX 637 (ALT 250 FOR IP): Performed by: CLINICAL NURSE SPECIALIST

## 2019-01-28 PROCEDURE — 80069 RENAL FUNCTION PANEL: CPT

## 2019-01-28 PROCEDURE — 99232 SBSQ HOSP IP/OBS MODERATE 35: CPT | Performed by: CLINICAL NURSE SPECIALIST

## 2019-01-28 PROCEDURE — 6370000000 HC RX 637 (ALT 250 FOR IP): Performed by: INTERNAL MEDICINE

## 2019-01-28 PROCEDURE — 85025 COMPLETE CBC W/AUTO DIFF WBC: CPT

## 2019-01-28 PROCEDURE — 36415 COLL VENOUS BLD VENIPUNCTURE: CPT

## 2019-01-28 RX ORDER — VALSARTAN 160 MG/1
160 TABLET ORAL DAILY
Qty: 30 TABLET | Refills: 3 | Status: SHIPPED | OUTPATIENT
Start: 2019-01-29 | End: 2019-03-01 | Stop reason: SDUPTHER

## 2019-01-28 RX ORDER — TORSEMIDE 20 MG/1
40 TABLET ORAL DAILY
Qty: 30 TABLET | Refills: 3 | Status: SHIPPED | OUTPATIENT
Start: 2019-01-29 | End: 2019-02-01

## 2019-01-28 RX ADMIN — NIFEDIPINE 30 MG: 30 TABLET, FILM COATED, EXTENDED RELEASE ORAL at 11:11

## 2019-01-28 RX ADMIN — CARVEDILOL 37.5 MG: 25 TABLET, FILM COATED ORAL at 11:12

## 2019-01-28 RX ADMIN — TORSEMIDE 40 MG: 20 TABLET ORAL at 11:11

## 2019-01-28 RX ADMIN — VALSARTAN 160 MG: 160 TABLET, FILM COATED ORAL at 11:11

## 2019-01-29 ENCOUNTER — OFFICE VISIT (OUTPATIENT)
Dept: PULMONOLOGY | Age: 49
End: 2019-01-29
Payer: COMMERCIAL

## 2019-01-29 ENCOUNTER — TELEPHONE (OUTPATIENT)
Dept: OTHER | Age: 49
End: 2019-01-29

## 2019-01-29 VITALS
BODY MASS INDEX: 48.82 KG/M2 | DIASTOLIC BLOOD PRESSURE: 80 MMHG | OXYGEN SATURATION: 96 % | HEIGHT: 65 IN | WEIGHT: 293 LBS | HEART RATE: 85 BPM | SYSTOLIC BLOOD PRESSURE: 110 MMHG

## 2019-01-29 DIAGNOSIS — I50.22 SYSTOLIC CHF, CHRONIC (HCC): Chronic | ICD-10-CM

## 2019-01-29 DIAGNOSIS — I10 HYPERTENSION, ESSENTIAL: Chronic | ICD-10-CM

## 2019-01-29 DIAGNOSIS — R06.83 SNORING: ICD-10-CM

## 2019-01-29 DIAGNOSIS — G47.10 HYPERSOMNIA: Primary | ICD-10-CM

## 2019-01-29 PROCEDURE — G8427 DOCREV CUR MEDS BY ELIG CLIN: HCPCS | Performed by: INTERNAL MEDICINE

## 2019-01-29 PROCEDURE — G8417 CALC BMI ABV UP PARAM F/U: HCPCS | Performed by: INTERNAL MEDICINE

## 2019-01-29 PROCEDURE — 99244 OFF/OP CNSLTJ NEW/EST MOD 40: CPT | Performed by: INTERNAL MEDICINE

## 2019-01-29 PROCEDURE — G8484 FLU IMMUNIZE NO ADMIN: HCPCS | Performed by: INTERNAL MEDICINE

## 2019-01-29 ASSESSMENT — SLEEP AND FATIGUE QUESTIONNAIRES
HOW LIKELY ARE YOU TO NOD OFF OR FALL ASLEEP WHILE SITTING AND READING: 1
NECK CIRCUMFERENCE (INCHES): 17
HOW LIKELY ARE YOU TO NOD OFF OR FALL ASLEEP WHILE WATCHING TV: 1
HOW LIKELY ARE YOU TO NOD OFF OR FALL ASLEEP IN A CAR, WHILE STOPPED FOR A FEW MINUTES IN TRAFFIC: 0
HOW LIKELY ARE YOU TO NOD OFF OR FALL ASLEEP WHEN YOU ARE A PASSENGER IN A CAR FOR AN HOUR WITHOUT A BREAK: 1
HOW LIKELY ARE YOU TO NOD OFF OR FALL ASLEEP WHILE LYING DOWN TO REST IN THE AFTERNOON WHEN CIRCUMSTANCES PERMIT: 2
HOW LIKELY ARE YOU TO NOD OFF OR FALL ASLEEP WHILE SITTING AND TALKING TO SOMEONE: 0
HOW LIKELY ARE YOU TO NOD OFF OR FALL ASLEEP WHILE SITTING QUIETLY AFTER LUNCH WITHOUT ALCOHOL: 1
ESS TOTAL SCORE: 6
HOW LIKELY ARE YOU TO NOD OFF OR FALL ASLEEP WHILE SITTING INACTIVE IN A PUBLIC PLACE: 0

## 2019-01-29 ASSESSMENT — ENCOUNTER SYMPTOMS
CHOKING: 0
SHORTNESS OF BREATH: 1
ABDOMINAL PAIN: 0
APNEA: 1
RHINORRHEA: 0
VOMITING: 0
ALLERGIC/IMMUNOLOGIC NEGATIVE: 1
ABDOMINAL DISTENTION: 0
CHEST TIGHTNESS: 0
PHOTOPHOBIA: 0
NAUSEA: 0
EYE PAIN: 0

## 2019-02-01 ENCOUNTER — OFFICE VISIT (OUTPATIENT)
Dept: CARDIOLOGY CLINIC | Age: 49
End: 2019-02-01
Payer: COMMERCIAL

## 2019-02-01 VITALS
OXYGEN SATURATION: 93 % | HEART RATE: 70 BPM | WEIGHT: 293 LBS | SYSTOLIC BLOOD PRESSURE: 132 MMHG | DIASTOLIC BLOOD PRESSURE: 68 MMHG | BODY MASS INDEX: 48.82 KG/M2 | RESPIRATION RATE: 16 BRPM | HEIGHT: 65 IN

## 2019-02-01 DIAGNOSIS — I10 ESSENTIAL HYPERTENSION: ICD-10-CM

## 2019-02-01 DIAGNOSIS — R91.1 PULMONARY NODULE: ICD-10-CM

## 2019-02-01 DIAGNOSIS — N18.30 STAGE 3 CHRONIC KIDNEY DISEASE (HCC): ICD-10-CM

## 2019-02-01 DIAGNOSIS — I42.9 CARDIOMYOPATHY, UNSPECIFIED TYPE (HCC): ICD-10-CM

## 2019-02-01 DIAGNOSIS — Z72.0 TOBACCO USE: ICD-10-CM

## 2019-02-01 DIAGNOSIS — I50.42 CHRONIC COMBINED SYSTOLIC AND DIASTOLIC HEART FAILURE (HCC): Primary | ICD-10-CM

## 2019-02-01 PROCEDURE — G8417 CALC BMI ABV UP PARAM F/U: HCPCS | Performed by: NURSE PRACTITIONER

## 2019-02-01 PROCEDURE — G8484 FLU IMMUNIZE NO ADMIN: HCPCS | Performed by: NURSE PRACTITIONER

## 2019-02-01 PROCEDURE — 4004F PT TOBACCO SCREEN RCVD TLK: CPT | Performed by: NURSE PRACTITIONER

## 2019-02-01 PROCEDURE — 99214 OFFICE O/P EST MOD 30 MIN: CPT | Performed by: NURSE PRACTITIONER

## 2019-02-01 PROCEDURE — G8427 DOCREV CUR MEDS BY ELIG CLIN: HCPCS | Performed by: NURSE PRACTITIONER

## 2019-02-07 ENCOUNTER — TELEPHONE (OUTPATIENT)
Dept: CARDIOLOGY CLINIC | Age: 49
End: 2019-02-07

## 2019-02-16 ENCOUNTER — HOSPITAL ENCOUNTER (OUTPATIENT)
Age: 49
Discharge: HOME | End: 2019-02-16
Payer: COMMERCIAL

## 2019-02-16 DIAGNOSIS — I50.42 CHRONIC COMBINED SYSTOLIC AND DIASTOLIC HEART FAILURE (HCC): ICD-10-CM

## 2019-02-16 LAB
ANION GAP SERPL CALCULATED.3IONS-SCNC: 11 MMOL/L (ref 3–16)
BUN BLDV-MCNC: 20 MG/DL (ref 7–20)
CALCIUM SERPL-MCNC: 9.4 MG/DL (ref 8.3–10.6)
CHLORIDE BLD-SCNC: 103 MMOL/L (ref 99–110)
CO2: 28 MMOL/L (ref 21–32)
CREAT SERPL-MCNC: 1.3 MG/DL (ref 0.6–1.1)
GFR AFRICAN AMERICAN: 53
GFR NON-AFRICAN AMERICAN: 44
GLUCOSE BLD-MCNC: 96 MG/DL (ref 70–99)
POTASSIUM SERPL-SCNC: 4.9 MMOL/L (ref 3.5–5.1)
PRO-BNP: 233 PG/ML (ref 0–124)
SODIUM BLD-SCNC: 142 MMOL/L (ref 136–145)

## 2019-02-16 PROCEDURE — 80048 BASIC METABOLIC PNL TOTAL CA: CPT

## 2019-02-16 PROCEDURE — 36415 COLL VENOUS BLD VENIPUNCTURE: CPT

## 2019-02-16 PROCEDURE — 83880 ASSAY OF NATRIURETIC PEPTIDE: CPT

## 2019-02-17 ENCOUNTER — HOSPITAL ENCOUNTER (OUTPATIENT)
Dept: SLEEP CENTER | Age: 49
Discharge: HOME | End: 2019-02-17
Payer: COMMERCIAL

## 2019-02-17 DIAGNOSIS — G47.10 HYPERSOMNIA: ICD-10-CM

## 2019-02-17 DIAGNOSIS — R06.83 SNORING: ICD-10-CM

## 2019-02-17 PROCEDURE — 95810 POLYSOM 6/> YRS 4/> PARAM: CPT

## 2019-02-18 ENCOUNTER — TELEPHONE (OUTPATIENT)
Dept: CARDIOLOGY CLINIC | Age: 49
End: 2019-02-18

## 2019-02-19 PROCEDURE — 95810 POLYSOM 6/> YRS 4/> PARAM: CPT | Performed by: INTERNAL MEDICINE

## 2019-02-20 ENCOUNTER — TELEPHONE (OUTPATIENT)
Dept: PULMONOLOGY | Age: 49
End: 2019-02-20

## 2019-02-21 ENCOUNTER — TELEPHONE (OUTPATIENT)
Dept: PULMONOLOGY | Age: 49
End: 2019-02-21

## 2019-02-21 DIAGNOSIS — G47.33 OBSTRUCTIVE SLEEP APNEA (ADULT) (PEDIATRIC): Primary | ICD-10-CM

## 2019-02-22 ENCOUNTER — TELEPHONE (OUTPATIENT)
Dept: PULMONOLOGY | Age: 49
End: 2019-02-22

## 2019-02-25 ENCOUNTER — TELEPHONE (OUTPATIENT)
Dept: PULMONOLOGY | Age: 49
End: 2019-02-25

## 2019-03-01 ENCOUNTER — OFFICE VISIT (OUTPATIENT)
Dept: CARDIOLOGY CLINIC | Age: 49
End: 2019-03-01
Payer: COMMERCIAL

## 2019-03-01 VITALS
SYSTOLIC BLOOD PRESSURE: 128 MMHG | BODY MASS INDEX: 48.82 KG/M2 | DIASTOLIC BLOOD PRESSURE: 70 MMHG | OXYGEN SATURATION: 94 % | HEIGHT: 65 IN | WEIGHT: 293 LBS | HEART RATE: 87 BPM | RESPIRATION RATE: 16 BRPM

## 2019-03-01 DIAGNOSIS — Z72.0 TOBACCO USE: ICD-10-CM

## 2019-03-01 DIAGNOSIS — N18.30 STAGE 3 CHRONIC KIDNEY DISEASE (HCC): ICD-10-CM

## 2019-03-01 DIAGNOSIS — I42.8 OTHER CARDIOMYOPATHY (HCC): ICD-10-CM

## 2019-03-01 DIAGNOSIS — I50.22 CHRONIC SYSTOLIC HEART FAILURE (HCC): Primary | ICD-10-CM

## 2019-03-01 DIAGNOSIS — G51.4 FACIAL TWITCHING: ICD-10-CM

## 2019-03-01 DIAGNOSIS — I10 ESSENTIAL HYPERTENSION: ICD-10-CM

## 2019-03-01 PROCEDURE — 4004F PT TOBACCO SCREEN RCVD TLK: CPT | Performed by: NURSE PRACTITIONER

## 2019-03-01 PROCEDURE — 99214 OFFICE O/P EST MOD 30 MIN: CPT | Performed by: NURSE PRACTITIONER

## 2019-03-01 PROCEDURE — G8427 DOCREV CUR MEDS BY ELIG CLIN: HCPCS | Performed by: NURSE PRACTITIONER

## 2019-03-01 PROCEDURE — G8484 FLU IMMUNIZE NO ADMIN: HCPCS | Performed by: NURSE PRACTITIONER

## 2019-03-01 PROCEDURE — G8417 CALC BMI ABV UP PARAM F/U: HCPCS | Performed by: NURSE PRACTITIONER

## 2019-03-01 RX ORDER — CARVEDILOL 25 MG/1
50 TABLET ORAL 2 TIMES DAILY WITH MEALS
Qty: 120 TABLET | Refills: 1 | Status: SHIPPED | OUTPATIENT
Start: 2019-03-01 | End: 2020-01-31 | Stop reason: SDUPTHER

## 2019-03-01 RX ORDER — VALSARTAN 160 MG/1
160 TABLET ORAL 2 TIMES DAILY
Qty: 60 TABLET | Refills: 3 | Status: SHIPPED | OUTPATIENT
Start: 2019-03-01 | End: 2020-02-27 | Stop reason: ALTCHOICE

## 2019-03-17 ENCOUNTER — HOSPITAL ENCOUNTER (OUTPATIENT)
Dept: SLEEP CENTER | Age: 49
Discharge: HOME | End: 2019-03-17
Payer: COMMERCIAL

## 2019-03-17 PROCEDURE — 95811 POLYSOM 6/>YRS CPAP 4/> PARM: CPT

## 2019-03-19 ENCOUNTER — TELEPHONE (OUTPATIENT)
Dept: PULMONOLOGY | Age: 49
End: 2019-03-19

## 2019-03-19 PROCEDURE — 95811 POLYSOM 6/>YRS CPAP 4/> PARM: CPT | Performed by: INTERNAL MEDICINE

## 2019-04-24 ENCOUNTER — TELEPHONE (OUTPATIENT)
Dept: PULMONOLOGY | Age: 49
End: 2019-04-24

## 2019-04-24 NOTE — TELEPHONE ENCOUNTER
Serg Borrego from [facility] has tried 3 times to contact patient. Serg Borrego did speak with patients mother today and she will give her the message, that her order is ready.  Zoie [phone]

## 2019-05-01 ENCOUNTER — TELEPHONE (OUTPATIENT)
Dept: PULMONOLOGY | Age: 49
End: 2019-05-01

## 2019-05-09 ENCOUNTER — TELEPHONE (OUTPATIENT)
Dept: PULMONOLOGY | Age: 49
End: 2019-05-09

## 2019-05-09 NOTE — TELEPHONE ENCOUNTER
Isael Members from [place] called and said patient was a no-show Saturday to  equipment. Isael Jones has tried three time to contact her.  Rayne Flores

## 2019-07-23 ENCOUNTER — TELEPHONE (OUTPATIENT)
Dept: PULMONOLOGY | Age: 49
End: 2019-07-23

## 2019-07-25 ENCOUNTER — OFFICE VISIT (OUTPATIENT)
Dept: PULMONOLOGY | Age: 49
End: 2019-07-25
Payer: COMMERCIAL

## 2019-07-25 VITALS
SYSTOLIC BLOOD PRESSURE: 136 MMHG | OXYGEN SATURATION: 97 % | HEART RATE: 80 BPM | DIASTOLIC BLOOD PRESSURE: 74 MMHG | HEIGHT: 65 IN | BODY MASS INDEX: 48.82 KG/M2 | WEIGHT: 293 LBS

## 2019-07-25 DIAGNOSIS — E66.01 MORBID OBESITY WITH BODY MASS INDEX (BMI) OF 60.0 TO 69.9 IN ADULT (HCC): ICD-10-CM

## 2019-07-25 DIAGNOSIS — G47.33 OBSTRUCTIVE SLEEP APNEA (ADULT) (PEDIATRIC): Primary | ICD-10-CM

## 2019-07-25 DIAGNOSIS — I50.22 SYSTOLIC CHF, CHRONIC (HCC): Chronic | ICD-10-CM

## 2019-07-25 DIAGNOSIS — I10 HYPERTENSION, ESSENTIAL: Chronic | ICD-10-CM

## 2019-07-25 DIAGNOSIS — I50.9 CONGESTIVE HEART FAILURE, UNSPECIFIED HF CHRONICITY, UNSPECIFIED HEART FAILURE TYPE (HCC): ICD-10-CM

## 2019-07-25 PROCEDURE — 4004F PT TOBACCO SCREEN RCVD TLK: CPT | Performed by: NURSE PRACTITIONER

## 2019-07-25 PROCEDURE — G8417 CALC BMI ABV UP PARAM F/U: HCPCS | Performed by: NURSE PRACTITIONER

## 2019-07-25 PROCEDURE — 99214 OFFICE O/P EST MOD 30 MIN: CPT | Performed by: NURSE PRACTITIONER

## 2019-07-25 PROCEDURE — G8427 DOCREV CUR MEDS BY ELIG CLIN: HCPCS | Performed by: NURSE PRACTITIONER

## 2019-07-25 ASSESSMENT — ENCOUNTER SYMPTOMS
SINUS PRESSURE: 0
ABDOMINAL DISTENTION: 0
EYE REDNESS: 0
SHORTNESS OF BREATH: 0
RHINORRHEA: 0
COUGH: 0
EYE PAIN: 0
ABDOMINAL PAIN: 0
APNEA: 0

## 2019-07-25 ASSESSMENT — SLEEP AND FATIGUE QUESTIONNAIRES
HOW LIKELY ARE YOU TO NOD OFF OR FALL ASLEEP IN A CAR, WHILE STOPPED FOR A FEW MINUTES IN TRAFFIC: 0
HOW LIKELY ARE YOU TO NOD OFF OR FALL ASLEEP WHILE LYING DOWN TO REST IN THE AFTERNOON WHEN CIRCUMSTANCES PERMIT: 2
HOW LIKELY ARE YOU TO NOD OFF OR FALL ASLEEP WHILE WATCHING TV: 2
HOW LIKELY ARE YOU TO NOD OFF OR FALL ASLEEP WHILE SITTING INACTIVE IN A PUBLIC PLACE: 0
HOW LIKELY ARE YOU TO NOD OFF OR FALL ASLEEP WHEN YOU ARE A PASSENGER IN A CAR FOR AN HOUR WITHOUT A BREAK: 1
ESS TOTAL SCORE: 7
HOW LIKELY ARE YOU TO NOD OFF OR FALL ASLEEP WHILE SITTING AND READING: 2
HOW LIKELY ARE YOU TO NOD OFF OR FALL ASLEEP WHILE SITTING AND TALKING TO SOMEONE: 0
HOW LIKELY ARE YOU TO NOD OFF OR FALL ASLEEP WHILE SITTING QUIETLY AFTER LUNCH WITHOUT ALCOHOL: 0

## 2019-07-25 NOTE — ASSESSMENT & PLAN NOTE
New partially treated     Reviewed compliance download with pt. Supplies and parts as needed for her machine. These are medically necessary. Continue medications per her PCP and other physicians. Limit caffeine use after 3pm.  Encouraged her to work on weight loss through diet and exercise. Diagnoses of Systolic CHF, chronic ([address] Morbid obesity with body mass index (BMI) of 60.0 to 69.9 in adult [facility]), Hypertension, essential, and Congestive heart failure, unspecified HF chronicity, unspecified heart failure type [facility]) were pertinent to this visit. The chronic medical conditions listed are directly related to the primary diagnosis listed above. The management of the primary diagnosis affects the secondary diagnosis and vice versa.

## 2019-07-25 NOTE — PROGRESS NOTES
Rekha Castillo MD, FAASM, [facility]  Uziel Funes, MSN, RN, CNP [address]  3rd Floor, [address] [facility], [address] [phone]   [address] [place] [address] 25 [address] Heaven Benson, . Sharon [address] [phone]       [address] [facility] [address] [phone]    Subjective:     Patient ID: Joe Osorio is a 54 y.o. female. Chief Complaint   Patient presents with    Sleep Apnea       HPI:      Had study formal in lab study done on 2/17/2019 which showed an AHI - 146/hr with Low SaO2 - <35% and time below 90% of 333min. This is consistent with severe ARMANDO (327.23)    Titration done on: 3/17/2019    Machine Present in office today: Yes     Machine Modem/Download Info:  Compliance (hours/night): 2.5 hrs/night  Download AHI (/hour): 9.2 /HR     Average IPAP Pressure: 9 cmH2O  Average EPAP Pressure: 7.6 cmH2O           ASV - Settings (Levi)  IPAP Max: 25 cmH2O  EPAP Min: 4 cmH2O  EPAP Max: 15 cmH2O  Pressure Support Min: 5  Pressure Support Max: 8  Breaths Per Min (BPM): auto           Comfort Settings  Humidity Level (0-8): 3  Flex/EPR (0-3): 2 PAP Mask  Mask Type: Full Face mask       [place] - Total score: 7    Last Visit : January 2019    Per patient the listed Co-morbidities: CHF, Obesity, HTN, CHF,     are reasonably well controlled at this time. Subjective Health Changes: None      Follow-up :       Over Night Oximetry: [] Yes  [] No  [x] NA   Using O2: [] Yes  [] No  [x] NA   Patient is compliant with the machine  [] Yes  [x] No   Feeling rested when using the machine   [] Yes  [x] No     Pressure is comfortable with inspiration and expiration  [] Yes  [x] No     Noticed changes in pressure   [] Yes  [] No  [x] NA   Mask is fitting well  [x] Yes  [] No   Noting Mask Air Leak  [x] Yes  [] No   Having painful Aerophagia  [x] Yes  [] No   Nocturia   2-4  per night.    Having  HA upon waking  [x] Yes [] No   Dry mouth upon waking   Dry Nose  Dry Eyes  [x] Yes  [] No   Congestion upon waking   [] Yes  [x] No    Nose Bleeds  [] Yes  [x] No   Using Sleep Aides    [x] NA   Understands how to change humidification and/or tubing temperature for comfort while at home  [x] Yes  [] No     Difficulties falling asleep  [x] Yes  [] No   Difficulties staying asleep  [x] Yes  [] No   Approximate time to bed  12-1am   Approximate wake time  7-9am   Taking Naps  occasional   If taking naps usual length  1-1.5 hours    If taking naps using the machine  [] Yes  [x] No  [] NA   Drowsy when driving  [] Yes  [x] No     Does patient carry a DOT/CDL  [] Yes  [x] No     Does patient carry FAA/Pilots License   [] Yes  [x] No      Any concerns noted with the machine at this time  [] Yes  [x] No        Diagnosis Orders   1. Obstructive sleep apnea (adult) (pediatric)     2. Systolic CHF, chronic ([facility]     3. Morbid obesity with body mass index (BMI) of 60.0 to 69.9 in adult ([facility]     4. Hypertension, essential     5. Congestive heart failure, unspecified HF chronicity, unspecified heart failure type [facility])         The chronic medical conditions listed are directly related to the primary diagnosis listed above. The management of the primary diagnosis affects the secondary diagnosis and vice versa. Review of Systems   Constitutional: Positive for fatigue. Negative for appetite change, chills and fever. HENT: Negative for congestion, nosebleeds, rhinorrhea and sinus pressure. Eyes: Negative for pain and redness. Respiratory: Negative for apnea, cough and shortness of breath. Cardiovascular: Negative for chest pain and palpitations. Gastrointestinal: Negative for abdominal distention and abdominal pain. Neurological: Positive for headaches. Negative for dizziness. Psychiatric/Behavioral: Positive for sleep disturbance.        Social History     Socioeconomic History    Marital status:      Spouse name: Not on file step for control of her severe ARMANDO would be trach placement   -F/U: 3 months    No orders of the defined types were placed in this encounter. No orders of the defined types were placed in this encounter. No orders of the defined types were placed in this encounter.       Cory Bernal, MSN, RN, CNP

## 2019-11-04 ENCOUNTER — TELEPHONE (OUTPATIENT)
Dept: PULMONOLOGY | Age: 49
End: 2019-11-04

## 2020-01-01 RX ORDER — TORSEMIDE 20 MG/1
TABLET ORAL
Qty: 90 TABLET | Refills: 1 | Status: SHIPPED | OUTPATIENT
Start: 2020-01-01 | End: 2021-01-01 | Stop reason: SDUPTHER

## 2020-01-01 RX ORDER — CARVEDILOL 25 MG/1
TABLET ORAL
Qty: 180 TABLET | Refills: 0 | Status: SHIPPED | OUTPATIENT
Start: 2020-01-01 | End: 2021-01-01

## 2020-01-01 RX ORDER — SPIRONOLACTONE 25 MG/1
TABLET ORAL
Qty: 30 TABLET | Refills: 1 | Status: SHIPPED | OUTPATIENT
Start: 2020-01-01 | End: 2021-01-01

## 2020-01-28 RX ORDER — TORSEMIDE 20 MG/1
40 TABLET ORAL DAILY
Qty: 68 TABLET | Refills: 0 | OUTPATIENT
Start: 2020-01-28

## 2020-01-28 RX ORDER — VALSARTAN 160 MG/1
160 TABLET ORAL 2 TIMES DAILY
Qty: 68 TABLET | Refills: 0 | OUTPATIENT
Start: 2020-01-28

## 2020-01-28 RX ORDER — CARVEDILOL 25 MG/1
50 TABLET ORAL 2 TIMES DAILY WITH MEALS
Qty: 128 TABLET | Refills: 0 | OUTPATIENT
Start: 2020-01-28

## 2020-01-28 NOTE — TELEPHONE ENCOUNTER
Medication Refill    Medication needing refilled:  carvedilol (COREG) 25 MG tablet    valsartan (DIOVAN) 160 MG tablet    torsemide (DEMADEX) 20 MG tablet      Doseage of the medication:    How are you taking this medication (QD, BID, TID, QID, PRN):    30 or 90 day supply called in: 80    Which Pharmacy are we sending the medication to?: [facility] [phone]      Medication Question/Concern    What is the name of the medication you need to speak with someone about? NIFEdipine    Doseage of the medication: 30 mg    How are you taking this medication: 1 tablet daily    What issues/concerns are you having with this medication: Is the patient still supposed to be taking this? Per Epic it was discontinued on 1-27-19. She stated she needs this medication.

## 2020-01-28 NOTE — TELEPHONE ENCOUNTER
Last OV: 3/19/19  Last labs:  2/16/19  Appt scheduled : 3/2/20    Filled to hold her till next OV. Was unable to reach to see who's been filling in the mean time.  Not a good number    Could not pend, due to dosing new pharmacy

## 2020-01-30 ENCOUNTER — APPOINTMENT (OUTPATIENT)
Dept: GENERAL RADIOLOGY | Age: 50
End: 2020-01-30
Payer: COMMERCIAL

## 2020-01-30 ENCOUNTER — HOSPITAL ENCOUNTER (EMERGENCY)
Age: 50
Discharge: LEFT BEFORE DISCHARGE | End: 2020-01-30
Payer: COMMERCIAL

## 2020-01-30 VITALS
DIASTOLIC BLOOD PRESSURE: 139 MMHG | OXYGEN SATURATION: 93 % | HEART RATE: 98 BPM | RESPIRATION RATE: 16 BRPM | HEIGHT: 65 IN | TEMPERATURE: 97.9 F | BODY MASS INDEX: 48.82 KG/M2 | WEIGHT: 293 LBS | SYSTOLIC BLOOD PRESSURE: 190 MMHG

## 2020-01-30 LAB
A/G RATIO: 0.8 (ref 1.1–2.2)
ALBUMIN SERPL-MCNC: 3.6 G/DL (ref 3.4–5)
ALP BLD-CCNC: 87 U/L (ref 40–129)
ALT SERPL-CCNC: 16 U/L (ref 10–40)
ANION GAP SERPL CALCULATED.3IONS-SCNC: 10 MMOL/L (ref 3–16)
AST SERPL-CCNC: 32 U/L (ref 15–37)
BILIRUB SERPL-MCNC: 0.6 MG/DL (ref 0–1)
BUN BLDV-MCNC: 16 MG/DL (ref 7–20)
CALCIUM SERPL-MCNC: 9.1 MG/DL (ref 8.3–10.6)
CHLORIDE BLD-SCNC: 102 MMOL/L (ref 99–110)
CO2: 24 MMOL/L (ref 21–32)
CREAT SERPL-MCNC: 1.2 MG/DL (ref 0.6–1.1)
EKG ATRIAL RATE: 99 BPM
EKG DIAGNOSIS: NORMAL
EKG P AXIS: 47 DEGREES
EKG P-R INTERVAL: 146 MS
EKG Q-T INTERVAL: 402 MS
EKG QRS DURATION: 86 MS
EKG QTC CALCULATION (BAZETT): 515 MS
EKG R AXIS: -79 DEGREES
EKG T AXIS: 93 DEGREES
EKG VENTRICULAR RATE: 99 BPM
GFR AFRICAN AMERICAN: 58
GFR NON-AFRICAN AMERICAN: 48
GLOBULIN: 4.4 G/DL
GLUCOSE BLD-MCNC: 123 MG/DL (ref 70–99)
HCT VFR BLD CALC: 48.2 % (ref 36–48)
HEMOGLOBIN: 15.1 G/DL (ref 12–16)
LIPASE: 20 U/L (ref 13–60)
MCH RBC QN AUTO: 28.6 PG (ref 26–34)
MCHC RBC AUTO-ENTMCNC: 31.3 G/DL (ref 31–36)
MCV RBC AUTO: 91.5 FL (ref 80–100)
PDW BLD-RTO: 16.5 % (ref 12.4–15.4)
PLATELET # BLD: 303 K/UL (ref 135–450)
PLATELET SLIDE REVIEW: ADEQUATE
PMV BLD AUTO: 8.8 FL (ref 5–10.5)
POTASSIUM REFLEX MAGNESIUM: 5.4 MMOL/L (ref 3.5–5.1)
RBC # BLD: 5.26 M/UL (ref 4–5.2)
SLIDE REVIEW: ABNORMAL
SODIUM BLD-SCNC: 136 MMOL/L (ref 136–145)
TOTAL PROTEIN: 8 G/DL (ref 6.4–8.2)
TROPONIN: <0.01 NG/ML
WBC # BLD: 11.7 K/UL (ref 4–11)

## 2020-01-30 PROCEDURE — 4500000002 HC ER NO CHARGE

## 2020-01-30 PROCEDURE — 93010 ELECTROCARDIOGRAM REPORT: CPT | Performed by: INTERNAL MEDICINE

## 2020-01-30 PROCEDURE — 93005 ELECTROCARDIOGRAM TRACING: CPT | Performed by: EMERGENCY MEDICINE

## 2020-01-30 PROCEDURE — 84484 ASSAY OF TROPONIN QUANT: CPT

## 2020-01-30 PROCEDURE — 80053 COMPREHEN METABOLIC PANEL: CPT

## 2020-01-30 PROCEDURE — 83690 ASSAY OF LIPASE: CPT

## 2020-01-30 PROCEDURE — 71046 X-RAY EXAM CHEST 2 VIEWS: CPT

## 2020-01-30 PROCEDURE — 85027 COMPLETE CBC AUTOMATED: CPT

## 2020-01-30 ASSESSMENT — PAIN DESCRIPTION - ONSET: ONSET: SUDDEN

## 2020-01-30 ASSESSMENT — PAIN DESCRIPTION - PROGRESSION: CLINICAL_PROGRESSION: NOT CHANGED

## 2020-01-30 ASSESSMENT — PAIN DESCRIPTION - ORIENTATION: ORIENTATION: UPPER

## 2020-01-30 ASSESSMENT — PAIN DESCRIPTION - PAIN TYPE: TYPE: ACUTE PAIN

## 2020-01-30 ASSESSMENT — PAIN DESCRIPTION - FREQUENCY: FREQUENCY: CONTINUOUS

## 2020-01-30 ASSESSMENT — PAIN SCALES - GENERAL: PAINLEVEL_OUTOF10: 8

## 2020-01-30 NOTE — ED NOTES
Bed: [facility]05  Expected date:   Expected time:   Means of arrival:   Comments:  PANCHO Blake RN  01/30/20 9433

## 2020-01-30 NOTE — TELEPHONE ENCOUNTER
Received call that the patient had changed her appt to see NPR earlier than March. She is requesting enough medication to last her until she is see. She is currently in the [facility] for abdominal Pain. Please advise thanks.

## 2020-01-31 RX ORDER — VALSARTAN 160 MG/1
160 TABLET ORAL 2 TIMES DAILY
Qty: 60 TABLET | Refills: 3 | OUTPATIENT
Start: 2020-01-31

## 2020-01-31 RX ORDER — CARVEDILOL 25 MG/1
50 TABLET ORAL 2 TIMES DAILY WITH MEALS
Qty: 14 TABLET | Refills: 0 | Status: SHIPPED | OUTPATIENT
Start: 2020-01-31 | End: 2020-02-27

## 2020-01-31 RX ORDER — TORSEMIDE 20 MG/1
40 TABLET ORAL DAILY
Qty: 7 TABLET | Refills: 0 | Status: SHIPPED | OUTPATIENT
Start: 2020-01-31 | End: 2020-02-27 | Stop reason: SDUPTHER

## 2020-02-20 ENCOUNTER — TELEPHONE (OUTPATIENT)
Dept: CARDIOLOGY CLINIC | Age: 50
End: 2020-02-20

## 2020-02-20 NOTE — TELEPHONE ENCOUNTER
She was in the hospital at [address] [place] [address] Dr recently and she was released to go back to work . She went to work yesterday and her boss doesn't want her back to work until she is seen and cleared by this office . They (her work) have had to call 911 for her a few times and the just are not comfortable with her there until cleared here . She was told to have this office call [employer], to get her short term disability started. Please let patient know this was done .

## 2020-02-24 NOTE — TELEPHONE ENCOUNTER
I spoke with the patient and relayed what NPRG said; also noting that it was doctors from [address] [place] [address]  per her message that released her to go back to work and [facility] stated that whoever she saw patient at [facility] and released her should be addressing this.   Verbalized understanding

## 2020-02-27 ENCOUNTER — OFFICE VISIT (OUTPATIENT)
Dept: CARDIOLOGY CLINIC | Age: 50
End: 2020-02-27
Payer: COMMERCIAL

## 2020-02-27 VITALS
HEIGHT: 65 IN | DIASTOLIC BLOOD PRESSURE: 88 MMHG | SYSTOLIC BLOOD PRESSURE: 147 MMHG | WEIGHT: 293 LBS | OXYGEN SATURATION: 95 % | BODY MASS INDEX: 48.82 KG/M2 | HEART RATE: 92 BPM

## 2020-02-27 PROCEDURE — G8417 CALC BMI ABV UP PARAM F/U: HCPCS | Performed by: NURSE PRACTITIONER

## 2020-02-27 PROCEDURE — G8484 FLU IMMUNIZE NO ADMIN: HCPCS | Performed by: NURSE PRACTITIONER

## 2020-02-27 PROCEDURE — 99214 OFFICE O/P EST MOD 30 MIN: CPT | Performed by: NURSE PRACTITIONER

## 2020-02-27 PROCEDURE — G8427 DOCREV CUR MEDS BY ELIG CLIN: HCPCS | Performed by: NURSE PRACTITIONER

## 2020-02-27 PROCEDURE — 1036F TOBACCO NON-USER: CPT | Performed by: NURSE PRACTITIONER

## 2020-02-27 RX ORDER — ISOSORBIDE MONONITRATE 30 MG/1
TABLET, EXTENDED RELEASE ORAL
Qty: 30 TABLET | Refills: 3 | Status: SHIPPED | OUTPATIENT
Start: 2020-02-27 | End: 2020-06-17 | Stop reason: SDUPTHER

## 2020-02-27 RX ORDER — HYDRALAZINE HYDROCHLORIDE 25 MG/1
TABLET, FILM COATED ORAL
Qty: 90 TABLET | Refills: 3 | Status: SHIPPED | OUTPATIENT
Start: 2020-02-27 | End: 2020-06-08

## 2020-02-27 RX ORDER — CARVEDILOL 25 MG/1
25 TABLET ORAL 2 TIMES DAILY WITH MEALS
Qty: 60 TABLET | Refills: 3 | Status: SHIPPED | OUTPATIENT
Start: 2020-02-27 | End: 2020-06-10

## 2020-02-27 RX ORDER — ISOSORBIDE MONONITRATE 30 MG/1
TABLET, EXTENDED RELEASE ORAL
COMMUNITY
Start: 2020-02-12 | End: 2020-02-27 | Stop reason: SDUPTHER

## 2020-02-27 RX ORDER — KETOROLAC TROMETHAMINE 10 MG/1
TABLET, FILM COATED ORAL
COMMUNITY
Start: 2020-01-31 | End: 2020-03-26 | Stop reason: ALTCHOICE

## 2020-02-27 RX ORDER — HYDRALAZINE HYDROCHLORIDE 25 MG/1
TABLET, FILM COATED ORAL
COMMUNITY
Start: 2020-02-12 | End: 2020-02-27 | Stop reason: SDUPTHER

## 2020-02-27 RX ORDER — METHOCARBAMOL 750 MG/1
TABLET, FILM COATED ORAL
COMMUNITY
Start: 2020-01-30 | End: 2020-03-26 | Stop reason: ALTCHOICE

## 2020-02-27 RX ORDER — TORSEMIDE 20 MG/1
40 TABLET ORAL DAILY
Qty: 60 TABLET | Refills: 3 | Status: SHIPPED | OUTPATIENT
Start: 2020-02-27 | End: 2020-06-08

## 2020-02-27 ASSESSMENT — ENCOUNTER SYMPTOMS
GASTROINTESTINAL NEGATIVE: 1
RESPIRATORY NEGATIVE: 1

## 2020-02-27 NOTE — PROGRESS NOTES
estimated to be 40mm Hg assuming that      the right atrial pressure was 15 mmHg.        Echo 1/22/2019:  Summary   -Definity was administered.   -Left ventricular cavity size is normal.   -There is severe concentric left ventricular hypertrophy.   -Overall left ventricular systolic function appears moderately reduced with an ejection fraction on the 35% range.   -There is moderate hypokinesis of the left ventricle.   -Grade II diastolic dysfunction with elevated LV filling   pressures. E/e\"=18.6.   -Mild mitral regurgitation.   -Mild to moderate tricuspid regurgitation.   -RVSP 40 mmHg.   -Dilated left atrium with a volume of 87 ml.   -The right ventricle is moderately enlarged.   -The right atrium is moderately dilated.     CT chest 1/29/2019:  Scattered pulmonary nodules, the largest of which is seen in the right lower   Lobe (9-10 mm).  There is underlying emphysema.       Scattered bandlike opacities in the lungs, likely subsegmental atelectasis or   scar       Mosaic attenuation is seen at the lung bases, likely due to small airways   disease or air trapping.      Venous doppler 1/23/2019:  Summary        Technically limited study due to patient's body habitus.        Within the limits of this study and in the visualized veins there is no    evidence of deep vein or superficial vein thrombosis involving the right    lower extremity and the left common femoral vein       Device: No   ICD counseling:Yes, Feb 2020    Activity: at baseline  Can you walk 1-2 blocks or do a moderate amount of house/yard work? Yes    NYHA Class: III     Sodium Restrictions: 2g  Fluid Restrictions: 48-64 oz/day  Sodium and fluid restriction compliance: good    Pt Education: The patient has received education on the following topics: dietary sodium restriction, heart failure medications, the importance of physical activity, symptom management and weight monitoring     ARMANDO Yes Treated: Yes    Past Medical History:   has a past medical history of Cardiomyopathy ([facility] Congestive heart failure (CHF) ([facility] Hypertension, and Hypertensive heart disease. Surgical History:   has a past surgical history that includes back surgery. Social History:   reports that she has quit smoking. She has never used smokeless tobacco. She reports current alcohol use. She reports current drug use. Drug: Marijuana. Family History:   No family history on file. HomeMedications:  Prior to Admission medications    Medication Sig Start Date End Date Taking? Authorizing Provider   hydrALAZINE (APRESOLINE) 25 MG tablet TAKE 1 TABLET BY MOUTH EVERY 8 HOURS. 2/12/20  Yes Historical Provider, MD   isosorbide mononitrate (IMDUR) 30 MG extended release tablet TAKE 1 TABLET BY MOUTH EVERY DAY 2/12/20  Yes Historical Provider, MD   ketorolac (TORADOL) 10 MG tablet TAKE 1 TABLET BY MOUTH 4 TIMES A DAY FOR 5 DAYS 1/31/20  Yes Historical Provider, MD   methocarbamol (ROBAXIN) 750 MG tablet  1/30/20  Yes Historical Provider, MD   carvedilol (COREG) 25 MG tablet Take 2 tablets by mouth 2 times daily (with meals)  Patient taking differently: Take 12.5 mg by mouth 3 times daily  1/31/20  Yes Shanice Hutton, APRN - CNS   torsemide (DEMADEX) 20 MG tablet Take 2 tablets by mouth daily 1/31/20  Yes [facility] [address], [address] , APRN - CNS   Blood Pressure Monitoring (SPHYGMOMANOMETER) [address] 1 each by Does not apply route daily 2/1/19  Yes [facility], APRN - CNP        Allergies:  Cefuroxime axetil     ROS:   Review of Systems   Constitutional: Positive for fatigue. Respiratory: Negative. Cardiovascular: Positive for leg swelling. Negative for chest pain and palpitations. Gastrointestinal: Negative. Genitourinary: Negative. Musculoskeletal: Negative. Skin: Negative. Neurological: Positive for dizziness and headaches. Hematological: Negative.         Physical Examination:    Vitals:    02/27/20 1453   BP: (!) 147/88   Site: Right Upper Arm   Position: Sitting   Cuff  01/30/2020     02/16/2019    CL 97 01/28/2019    CO2 24 01/30/2020    CO2 28 02/16/2019    CO2 32 01/28/2019    PHOS 5.1 01/28/2019    PHOS 5.6 01/27/2019    PHOS 4.8 01/26/2019    BUN 16 01/30/2020    BUN 20 02/16/2019    BUN 20 01/28/2019    CREATININE 1.2 01/30/2020    CREATININE 1.3 02/16/2019    CREATININE 1.3 01/28/2019     BNP:   Lab Results   Component Value Date    PROBNP 233 02/16/2019    PROBNP 406 01/24/2019    PROBNP 2,774 01/21/2019     Iron Studies:  No components found for: FE,  TIBC,  FERRITIN  Iron Deficiency Anemia:  No    IV Iron Therapy:  No  2017 ACC/AHA HF Guidelines:   intravenous iron replacement in patients with New York Heart Association (NYHA) class II and III HF and iron deficiency (ferritin <100 ng/ml or 100-300 ng/ml if transferrin saturation <20%), to improve functional status and QoL. Assessment/Plan:    1. Dilated cardiomyopathy ([address] - increase coreg, continue imdur and hydralazine, consider entresto at next OV. ST due to drop in EF   2. Hypertension, essential - elevated, increase coreg   3. Systolic CHF, chronic ([address] - compensated, labs today         Instructions:   1. Medications: increase coreg to 25mg twice a day, continue other medications  2. Labs: today  3. Referrals: stress test  4. Lifestyle Recommendations: Weigh yourself every day in the morning after urination, call Sameera Shi if wt increases 2-3lb in one day or 5lb in one week, Limit sodium to 2000mg/day and fluids to 2L or 64oz/day. Add a fish oil supplement if you are not already taking one.   5. Follow up: 4 weeks with Sameera Shi after stress test      [facility] CHF Resource Line: [phone]  Heart Failure Websites:   www.heart. org  Www.cardiosmart. org    Websites with Low Sodium Recipes:   www.mayoclinic.org/healthy-lifestyle/recipes/low-sodium-recipes  www.[employer]/Lyatisses    Smartphone christoph's that can help you keep track of symptoms, weights, and medications:  HF Storylines  HF Path  My HF  CareZone  Point of Care Heart Failure  WOWME  H2O Overload    Nutrition [place] to track calories, carbohydrates and sodium content of food:   CalorieKing  MyFitnessPal    I appreciate the opportunity of cooperating in the care of this individual.    Zaida Sandhu CNP, 2/27/2020,9:11 AM    QUALITY MEASURES  1. Tobacco Cessation Counseling: Yes  2. Retake of BP if >140/90:   Yes  3. Documentation to PCP/referring for new patient:  Sent to PCP at close of office visit  4. CAD patient on anti-platelet: NA  5. CAD patient on STATIN therapy:  NA  6. Patient with CHF and aFib on anticoagulation:  NA   7. Patient Education:Yes   8. BB for LVSD :  Yes   9. ACE/ARB for LVSD:  No, pt on nitrates, hydralazine  10.  Left Ventricular Ejection Fraction (LVEF) Assessment:  Yes

## 2020-02-27 NOTE — PATIENT INSTRUCTIONS
Instructions:   1. Medications: increase coreg to 25mg twice a day, continue other medications  2. Labs: today  3. Referrals: stress test  4. Lifestyle Recommendations: Weigh yourself every day in the morning after urination, call Lexa Bolton if wt increases 2-3lb in one day or 5lb in one week, Limit sodium to 2000mg/day and fluids to 2L or 64oz/day. Add a fish oil supplement if you are not already taking one.   5. Follow up: 4 weeks with Lexa Bolton after stress test      [address] [facility]: [phone]  Heart Failure Websites:   www.heart. org  Www.cardiosmart. org    Websites with Low Sodium Recipes:   www.mayoclinic.org/healthy-lifestyle/recipes/low-sodium-recipes  www.Ar[employer]/recipes    Smartphone glen's that can help you keep track of symptoms, weights, and medications:  HF Storylines  HF Path  My HF  CareZone  Point of Care Heart Failure  WOWME  H2O Overload    Nutrition [place] to track calories, carbohydrates and sodium content of food:   CalorieKing  MyFitnessPal

## 2020-03-06 ENCOUNTER — HOSPITAL ENCOUNTER (OUTPATIENT)
Age: 50
Discharge: HOME | End: 2020-03-06
Payer: COMMERCIAL

## 2020-03-06 ENCOUNTER — TELEPHONE (OUTPATIENT)
Dept: CARDIOLOGY CLINIC | Age: 50
End: 2020-03-06

## 2020-03-06 ENCOUNTER — HOSPITAL ENCOUNTER (OUTPATIENT)
Dept: NON INVASIVE DIAGNOSTICS | Age: 50
Discharge: HOME | End: 2020-03-06
Payer: COMMERCIAL

## 2020-03-06 LAB
ANION GAP SERPL CALCULATED.3IONS-SCNC: 11 MMOL/L (ref 3–16)
BUN BLDV-MCNC: 22 MG/DL (ref 7–20)
CALCIUM SERPL-MCNC: 8.9 MG/DL (ref 8.3–10.6)
CHLORIDE BLD-SCNC: 101 MMOL/L (ref 99–110)
CO2: 30 MMOL/L (ref 21–32)
CREAT SERPL-MCNC: 1.5 MG/DL (ref 0.6–1.1)
GFR AFRICAN AMERICAN: 45
GFR NON-AFRICAN AMERICAN: 37
GLUCOSE BLD-MCNC: 93 MG/DL (ref 70–99)
POTASSIUM SERPL-SCNC: 4.2 MMOL/L (ref 3.5–5.1)
PRO-BNP: 573 PG/ML (ref 0–124)
SODIUM BLD-SCNC: 142 MMOL/L (ref 136–145)

## 2020-03-06 PROCEDURE — 83880 ASSAY OF NATRIURETIC PEPTIDE: CPT

## 2020-03-06 PROCEDURE — 36415 COLL VENOUS BLD VENIPUNCTURE: CPT

## 2020-03-06 PROCEDURE — 80048 BASIC METABOLIC PNL TOTAL CA: CPT

## 2020-03-06 PROCEDURE — A9502 TC99M TETROFOSMIN: HCPCS | Performed by: NURSE PRACTITIONER

## 2020-03-06 PROCEDURE — 3430000000 HC RX DIAGNOSTIC RADIOPHARMACEUTICAL: Performed by: NURSE PRACTITIONER

## 2020-03-06 RX ADMIN — TETROFOSMIN 30 MILLICURIE: 1.38 INJECTION, POWDER, LYOPHILIZED, FOR SOLUTION INTRAVENOUS at 13:51

## 2020-03-06 NOTE — TELEPHONE ENCOUNTER
Type of paperwork is being dropped off: (FMLA/ DISABILITY/PATIENT ASSISTANCE)  FMLA      Who is the patient's provider: Tanesha Parikh        Where is the paperwork to be sent: [address], [address] [address] [facility], [phone].       missy

## 2020-03-09 ENCOUNTER — TELEPHONE (OUTPATIENT)
Dept: CARDIOLOGY CLINIC | Age: 50
End: 2020-03-09

## 2020-03-09 NOTE — TELEPHONE ENCOUNTER
Call placed to [phone] home number - this has been disconnected  [address] LM for the patient to call for results

## 2020-03-10 ENCOUNTER — HOSPITAL ENCOUNTER (OUTPATIENT)
Dept: NON INVASIVE DIAGNOSTICS | Age: 50
Discharge: HOME | End: 2020-03-10
Payer: COMMERCIAL

## 2020-03-10 LAB
LV EF: 50 %
LVEF MODALITY: NORMAL

## 2020-03-10 PROCEDURE — 93017 CV STRESS TEST TRACING ONLY: CPT | Performed by: INTERNAL MEDICINE

## 2020-03-10 PROCEDURE — 6360000002 HC RX W HCPCS: Performed by: INTERNAL MEDICINE

## 2020-03-10 PROCEDURE — 78452 HT MUSCLE IMAGE SPECT MULT: CPT | Performed by: NURSE PRACTITIONER

## 2020-03-10 PROCEDURE — A9502 TC99M TETROFOSMIN: HCPCS | Performed by: NURSE PRACTITIONER

## 2020-03-10 PROCEDURE — 3430000000 HC RX DIAGNOSTIC RADIOPHARMACEUTICAL: Performed by: NURSE PRACTITIONER

## 2020-03-10 RX ADMIN — REGADENOSON 0.4 MG: 0.08 INJECTION, SOLUTION INTRAVENOUS at 10:39

## 2020-03-10 RX ADMIN — TETROFOSMIN 30 MILLICURIE: 1.38 INJECTION, POWDER, LYOPHILIZED, FOR SOLUTION INTRAVENOUS at 10:46

## 2020-03-10 NOTE — PROGRESS NOTES
At start of Jas protocol, pt stated she could not walk on treadmill with incline and needed to stop. Pt requested to stop treadmill and stated she needed to be switched to the medicine. Instructed on Lexiscan Stress Test Procedure including possible side effects/ adverse reactions. Patient verbalizes  understanding and denies having any questions. See [address] [place] [address] Rd Cardiology.   Sidra Warner RN

## 2020-03-11 ENCOUNTER — TELEPHONE (OUTPATIENT)
Dept: CARDIOLOGY CLINIC | Age: 50
End: 2020-03-11

## 2020-03-11 NOTE — TELEPHONE ENCOUNTER
Patient returned call, stated Venita Degroot had called with questions about FMLA paperwork. Please call patient.

## 2020-03-13 ENCOUNTER — TELEPHONE (OUTPATIENT)
Dept: CARDIOLOGY CLINIC | Age: 50
End: 2020-03-13

## 2020-03-25 ASSESSMENT — ENCOUNTER SYMPTOMS
GASTROINTESTINAL NEGATIVE: 1
RESPIRATORY NEGATIVE: 1

## 2020-03-25 NOTE — PROGRESS NOTES
[facility]   Congestive Heart Failure    PrimaryCare Doctor:  No primary care provider on file. Primary Cardiologist: Nicanor Joel   Last/Next OV: 2017    Chief Complaint:  Fatigue, headaches    History of Present Illness:  Emy [employer] is a 54 y.o. female with PMH HTN, NICM, HFrEF (20% recovered to 55% then dropped to 35%), ARMANDO,  who presents today for CHF f/u. She had a hospitalization at [facility] in Jan this year for syncope, dx with flu and echo there showed decrease in EF to 20-25%  Valsartan stopped at that time for diarrhea and imdur/hydralzine started    At the last OV one month ago, ST ordered, coreg increased for HTN, lasix started for 3 days for volume overload and today her BP is improved and she is feeling better. ST wnl and showed some improvement in EF. Today she c/o continued dyspnea with exertion but thinks there is some improvement, still with fatigue, edema that is variable, and headaches  she denies chest pain, palpitations, orthopnea, PND, exertional chest pressure/discomfort, early saiety, syncope. Baseline Weight: 359  Wt Readings from Last 3 Encounters:   02/27/20 (!) 359 lb 12.8 oz (163.2 kg)   01/30/20 (!) 389 lb (176.4 kg)   07/25/19 (!) 385 lb (174.6 kg)          EF: 20-25%  Cardiac Imaging:ST 3/6/20  Marked LV enlargement    Mild reduction in LV systolic function with EF 50%    There is normal isotope uptake at stress and rest. There is no evidence of    myocardial ischemia or scar. Echo at [facility] Jan 2020  Left ventricle: The cavity size is normal. Wall thickness was increased in a pattern of moderate  LVH. Systolic function was severely reduced. The estimated ejection fraction was in the range of  20% to 25%. Diffuse hypokinesis. Features are consistent with a pseudonormal left ventricular filling pattern, with concomitant abnormal relaxation and increased filling pressure (grade 2  diastolic dysfunction).     - Left atrium: The atrium is mildly dilated.    - Right ventricle: The cavity size is moderately dilated. Wall thickness is normal. Systolic      function was normal by objective interpretation.    - Pulmonary arteries: Systolic pressure was mildly increased, estimated to be 40mm Hg assuming that      the right atrial pressure was 15 mmHg.        Echo 1/22/2019:  Summary   -Definity was administered.   -Left ventricular cavity size is normal.   -There is severe concentric left ventricular hypertrophy.   -Overall left ventricular systolic function appears moderately reduced with an ejection fraction on the 35% range.   -There is moderate hypokinesis of the left ventricle.   -Grade II diastolic dysfunction with elevated LV filling   pressures. E/e\"=18.6.   -Mild mitral regurgitation.   -Mild to moderate tricuspid regurgitation.   -RVSP 40 mmHg.   -Dilated left atrium with a volume of 87 ml.   -The right ventricle is moderately enlarged.   -The right atrium is moderately dilated.     CT chest 1/29/2019:  Scattered pulmonary nodules, the largest of which is seen in the right lower   Lobe (9-10 mm).  There is underlying emphysema.       Scattered bandlike opacities in the lungs, likely subsegmental atelectasis or   scar       Mosaic attenuation is seen at the lung bases, likely due to small airways   disease or air trapping.      Venous doppler 1/23/2019:  Summary        Technically limited study due to patient's body habitus.        Within the limits of this study and in the visualized veins there is no    evidence of deep vein or superficial vein thrombosis involving the right    lower extremity and the left common femoral vein       Device: No   ICD counseling:Yes, Feb 2020    Activity: at baseline  Can you walk 1-2 blocks or do a moderate amount of house/yard work? Yes    NYHA Class: III     Sodium Restrictions: 2g  Fluid Restrictions: 48-64 oz/day  Sodium and fluid restriction compliance: good    Pt Education: The patient has received education on the following topics: dietary sodium restriction, heart failure medications, the importance of physical activity, symptom management and weight monitoring     ARMANDO Yes Treated: Yes    Past Medical History:   has a past medical history of Cardiomyopathy ([facility] Congestive heart failure (CHF) ([facility] Hypertension, and Hypertensive heart disease. Surgical History:   has a past surgical history that includes back surgery. Social History:   reports that she has quit smoking. She has never used smokeless tobacco. She reports current alcohol use. She reports current drug use. Drug: Marijuana. Family History:   No family history on file. HomeMedications:  Prior to Admission medications    Medication Sig Start Date End Date Taking? Authorizing Provider   ketorolac (TORADOL) 10 MG tablet TAKE 1 TABLET BY MOUTH 4 TIMES A DAY FOR 5 DAYS 1/31/20   Historical Provider, MD   methocarbamol (ROBAXIN) 750 MG tablet  1/30/20   Historical Provider, MD   carvedilol (COREG) 25 MG tablet Take 1 tablet by mouth 2 times daily (with meals) 2/27/20   Landon Sandifer, APRN - CNP   Blood Pressure Monitoring QUARTERMAIN) MISC 1 each by Does not apply route daily 2/27/20   Landon Sandifer, APRN - CNP   hydrALAZINE (APRESOLINE) 25 MG tablet TAKE 1 TABLET BY MOUTH EVERY 8 HOURS. 2/27/20   Landon Sandifer, APRN - CNP   isosorbide mononitrate (IMDUR) 30 MG extended release tablet TAKE 1 TABLET BY MOUTH EVERY DAY 2/27/20   Landon Sandifer, APRN - CNP   torsemide (DEMADEX) 20 MG tablet Take 2 tablets by mouth daily 2/27/20   Landon Sandifer, APRN - CNP        Allergies:  Cefuroxime axetil     ROS:   Review of Systems   Constitutional: Positive for fatigue. Respiratory: Negative. Cardiovascular: Positive for leg swelling. Negative for chest pain and palpitations. Gastrointestinal: Negative. Genitourinary: Negative. Musculoskeletal: Negative. Skin: Negative. Neurological: Positive for headaches. Negative for dizziness. Hematological: Negative.         Physical

## 2020-03-26 ENCOUNTER — OFFICE VISIT (OUTPATIENT)
Dept: CARDIOLOGY CLINIC | Age: 50
End: 2020-03-26
Payer: COMMERCIAL

## 2020-03-26 VITALS
WEIGHT: 293 LBS | OXYGEN SATURATION: 93 % | BODY MASS INDEX: 48.82 KG/M2 | HEIGHT: 65 IN | HEART RATE: 70 BPM | DIASTOLIC BLOOD PRESSURE: 86 MMHG | SYSTOLIC BLOOD PRESSURE: 136 MMHG

## 2020-03-26 PROCEDURE — 99213 OFFICE O/P EST LOW 20 MIN: CPT | Performed by: NURSE PRACTITIONER

## 2020-03-26 PROCEDURE — G8417 CALC BMI ABV UP PARAM F/U: HCPCS | Performed by: NURSE PRACTITIONER

## 2020-03-26 PROCEDURE — G8484 FLU IMMUNIZE NO ADMIN: HCPCS | Performed by: NURSE PRACTITIONER

## 2020-03-26 PROCEDURE — G8427 DOCREV CUR MEDS BY ELIG CLIN: HCPCS | Performed by: NURSE PRACTITIONER

## 2020-03-26 PROCEDURE — 1036F TOBACCO NON-USER: CPT | Performed by: NURSE PRACTITIONER

## 2020-03-26 NOTE — PATIENT INSTRUCTIONS
Instructions:   1. Medications:continue other medications  2. Labs: before next OV  3. Lifestyle Recommendations: Weigh yourself every day in the morning after urination, call Alba Rankin if wt increases 2-3lb in one day or 5lb in one week, Limit sodium to 2000mg/day and fluids to 2L or 64oz/day.    4. Follow up:2 months      [facility] CHF Resource Line: [phone]

## 2020-03-27 PROBLEM — R07.89 CHEST DISCOMFORT: Status: RESOLVED | Noted: 2019-01-22 | Resolved: 2020-03-27

## 2020-03-27 PROBLEM — I50.42 CHRONIC COMBINED SYSTOLIC AND DIASTOLIC HEART FAILURE (HCC): Status: ACTIVE | Noted: 2019-01-21

## 2020-04-29 ENCOUNTER — TELEPHONE (OUTPATIENT)
Dept: CARDIOLOGY CLINIC | Age: 50
End: 2020-04-29

## 2020-06-05 NOTE — PROGRESS NOTES
[facility]   Congestive Heart Failure    PrimaryCare Doctor:  No primary care provider on file. Primary Cardiologist: Joo Greene   Last/Next OV: 2017    Chief Complaint:  Fatigue, headaches    History of Present Illness:  Emy Ramírez is a 54 y.o. female with PMH HTN, NICM, HFrEF (20% recovered to 55% then dropped to 35%), ARMANDO,  who presents today for CHF f/u. She had a hospitalization at [facility] in Jan this year for syncope, dx with flu and echo there showed decrease in EF to 20-25%  Valsartan stopped at that time for diarrhea and imdur/hydralzine started    At the last OV 3months ago: no change  Phone Call 4/29: Torsemide increased to 3/day for abd distension, helped but now it is back and wt up 7lb since last OV  Today she c/o dyspnea with exertion that is stable  Increased abd distension  she denies chest pain, palpitations, orthopnea, PND, exertional chest pressure/discomfort, early saiety, edema, or syncope. Baseline Weight: 359  Wt Readings from Last 3 Encounters:   03/26/20 (!) 382 lb (173.3 kg)   02/27/20 (!) 359 lb 12.8 oz (163.2 kg)   01/30/20 (!) 389 lb (176.4 kg)          EF: 20-25%  Cardiac Imaging: 3/6/20  Marked LV enlargement    Mild reduction in LV systolic function with EF 50%    There is normal isotope uptake at stress and rest. There is no evidence of    myocardial ischemia or scar. Echo at [facility] Jan 2020  Left ventricle: The cavity size is normal. Wall thickness was increased in a pattern of moderate  LVH. Systolic function was severely reduced. The estimated ejection fraction was in the range of  20% to 25%. Diffuse hypokinesis. Features are consistent with a pseudonormal left ventricular filling pattern, with concomitant abnormal relaxation and increased filling pressure (grade 2  diastolic dysfunction).     - Left atrium: The atrium is mildly dilated.    - Right ventricle: The cavity size is moderately dilated.  Wall thickness is normal. Systolic      function was normal by 8. BB for LVSD :  Yes   9. ACE/ARB for LVSD:  No, diarrhea with valsartan, pt on nitrates, hydralazine  10.  Left Ventricular Ejection Fraction (LVEF) Assessment:  Yes

## 2020-06-08 ENCOUNTER — OFFICE VISIT (OUTPATIENT)
Dept: CARDIOLOGY CLINIC | Age: 50
End: 2020-06-08
Payer: COMMERCIAL

## 2020-06-08 ENCOUNTER — HOSPITAL ENCOUNTER (OUTPATIENT)
Age: 50
Discharge: HOME | End: 2020-06-08
Payer: COMMERCIAL

## 2020-06-08 VITALS
BODY MASS INDEX: 48.82 KG/M2 | HEIGHT: 65 IN | SYSTOLIC BLOOD PRESSURE: 170 MMHG | HEART RATE: 97 BPM | WEIGHT: 293 LBS | RESPIRATION RATE: 22 BRPM | DIASTOLIC BLOOD PRESSURE: 111 MMHG

## 2020-06-08 LAB
ANION GAP SERPL CALCULATED.3IONS-SCNC: 9 MMOL/L (ref 3–16)
BUN BLDV-MCNC: 18 MG/DL (ref 7–20)
CALCIUM SERPL-MCNC: 9.1 MG/DL (ref 8.3–10.6)
CHLORIDE BLD-SCNC: 100 MMOL/L (ref 99–110)
CO2: 33 MMOL/L (ref 21–32)
CREAT SERPL-MCNC: 2 MG/DL (ref 0.6–1.1)
GFR AFRICAN AMERICAN: 32
GFR NON-AFRICAN AMERICAN: 27
GLUCOSE BLD-MCNC: 94 MG/DL (ref 70–99)
POTASSIUM SERPL-SCNC: 4.7 MMOL/L (ref 3.5–5.1)
PRO-BNP: 2242 PG/ML (ref 0–124)
SODIUM BLD-SCNC: 142 MMOL/L (ref 136–145)

## 2020-06-08 PROCEDURE — 1036F TOBACCO NON-USER: CPT | Performed by: NURSE PRACTITIONER

## 2020-06-08 PROCEDURE — G8427 DOCREV CUR MEDS BY ELIG CLIN: HCPCS | Performed by: NURSE PRACTITIONER

## 2020-06-08 PROCEDURE — 36415 COLL VENOUS BLD VENIPUNCTURE: CPT

## 2020-06-08 PROCEDURE — 99214 OFFICE O/P EST MOD 30 MIN: CPT | Performed by: NURSE PRACTITIONER

## 2020-06-08 PROCEDURE — G8417 CALC BMI ABV UP PARAM F/U: HCPCS | Performed by: NURSE PRACTITIONER

## 2020-06-08 PROCEDURE — 80048 BASIC METABOLIC PNL TOTAL CA: CPT

## 2020-06-08 PROCEDURE — 83880 ASSAY OF NATRIURETIC PEPTIDE: CPT

## 2020-06-08 RX ORDER — TORSEMIDE 20 MG/1
60 TABLET ORAL DAILY
Qty: 90 TABLET | Refills: 1 | Status: SHIPPED | OUTPATIENT
Start: 2020-06-08 | End: 2020-07-09

## 2020-06-08 RX ORDER — HYDRALAZINE HYDROCHLORIDE 50 MG/1
50 TABLET, FILM COATED ORAL 2 TIMES DAILY
Qty: 60 TABLET | Refills: 1 | Status: SHIPPED | OUTPATIENT
Start: 2020-06-08 | End: 2020-07-09

## 2020-06-08 RX ORDER — SPIRONOLACTONE 25 MG/1
25 TABLET ORAL DAILY
Qty: 30 TABLET | Refills: 1 | Status: SHIPPED | OUTPATIENT
Start: 2020-06-08 | End: 2020-07-09

## 2020-06-08 ASSESSMENT — ENCOUNTER SYMPTOMS
ABDOMINAL DISTENTION: 1
SHORTNESS OF BREATH: 1

## 2020-06-09 ENCOUNTER — TELEPHONE (OUTPATIENT)
Dept: CARDIOLOGY CLINIC | Age: 50
End: 2020-06-09

## 2020-06-10 RX ORDER — CARVEDILOL 25 MG/1
TABLET ORAL
Qty: 60 TABLET | Refills: 5 | Status: SHIPPED | OUTPATIENT
Start: 2020-06-10 | End: 2020-01-01

## 2020-06-17 RX ORDER — ISOSORBIDE MONONITRATE 30 MG/1
TABLET, EXTENDED RELEASE ORAL
Qty: 90 TABLET | Refills: 3 | Status: SHIPPED | OUTPATIENT
Start: 2020-06-17 | End: 2021-01-01 | Stop reason: SDUPTHER

## 2020-07-09 RX ORDER — HYDRALAZINE HYDROCHLORIDE 50 MG/1
TABLET, FILM COATED ORAL
Qty: 60 TABLET | Refills: 5 | Status: SHIPPED | OUTPATIENT
Start: 2020-07-09 | End: 2021-01-01

## 2020-07-09 RX ORDER — SPIRONOLACTONE 25 MG/1
TABLET ORAL
Qty: 30 TABLET | Refills: 1 | Status: SHIPPED | OUTPATIENT
Start: 2020-07-09 | End: 2020-09-14

## 2020-07-09 RX ORDER — TORSEMIDE 20 MG/1
TABLET ORAL
Qty: 90 TABLET | Refills: 1 | Status: SHIPPED | OUTPATIENT
Start: 2020-07-09 | End: 2020-09-17

## 2020-07-09 NOTE — TELEPHONE ENCOUNTER
Requested Prescriptions     Pending Prescriptions Disp Refills    hydrALAZINE (APRESOLINE) 50 MG tablet [Pharmacy Med Name: HYDRALAZINE 50 MG TABLET] 60 tablet 1     Sig: TAKE 1 TABLET BY MOUTH TWICE A DAY          Number: 60    Refills: 5    Last Office Visit: 6/8/2020     Next Office Visit: 8/11/2020

## 2020-08-12 RX ORDER — TORSEMIDE 20 MG/1
TABLET ORAL
Qty: 90 TABLET | Refills: 1 | OUTPATIENT
Start: 2020-08-12

## 2020-08-12 RX ORDER — SPIRONOLACTONE 25 MG/1
TABLET ORAL
Qty: 30 TABLET | Refills: 5 | OUTPATIENT
Start: 2020-08-12

## 2020-08-12 NOTE — TELEPHONE ENCOUNTER
Prescription refill    Last OV:06/08/20    Last Refill:07/09/20-Can we give more refills?     Labs:06/08/20    Future Appt:none scheduled at this time

## 2020-08-13 RX ORDER — SPIRONOLACTONE 25 MG/1
25 TABLET ORAL DAILY
Qty: 30 TABLET | Refills: 2 | OUTPATIENT
Start: 2020-08-13

## 2020-08-13 RX ORDER — TORSEMIDE 20 MG/1
20 TABLET ORAL DAILY
Qty: 90 TABLET | Refills: 1 | OUTPATIENT
Start: 2020-08-13

## 2020-08-13 NOTE — TELEPHONE ENCOUNTER
Prescription refill    Last OV:06/08/20    Last Refill:07/09/20    Labs:06/08/20    Future Appt:none scheduled at this time

## 2020-08-18 NOTE — TELEPHONE ENCOUNTER
LM for pt to call back to confirm appt after Echo on 09/21 8:30am with NPKV and to have pt get labs done per NPKV

## 2020-09-14 RX ORDER — SPIRONOLACTONE 25 MG/1
TABLET ORAL
Qty: 30 TABLET | Refills: 1 | Status: SHIPPED | OUTPATIENT
Start: 2020-09-14 | End: 2020-01-01

## 2020-09-17 RX ORDER — TORSEMIDE 20 MG/1
TABLET ORAL
Qty: 90 TABLET | Refills: 1 | Status: SHIPPED | OUTPATIENT
Start: 2020-09-17 | End: 2020-01-01

## 2020-09-18 NOTE — PROGRESS NOTES
[address]   Congestive Heart Failure    PrimaryCare Doctor:  No primary care provider on file. Primary Cardiologist: Andre Espinoza       Chief Complaint:      History of Present Illness:  Ana Luisa Cha is a 52 y.o. female with PMH HTN, NICM, HFrEF (20% recovered to 55% then dropped to 35%), ARMANDO,  who presents today for CHF f/u and echo. She had a hospitalization at [facility] in Jan this year for syncope, dx with flu and echo there showed decrease in EF to 20-25%  Valsartan stopped at that time for diarrhea and imdur/hydralzine started    At the last in June:   1. increase hydralazine to 50mg twice a day, start spironolactone 25mg once a day    Today she c/o some chest pain a few weeks ago, but states her MAR, abd dist and edema are improved. Wt and bp better today, echo today shows improvement in EF to 60%  she denies palpitations, orthopnea, PND, exertional chest pressure/discomfort, early saiety, fatigue or syncope. She is taking torsemide 60/20      Baseline Weight: 359  Wt Readings from Last 3 Encounters:   06/08/20 (!) 389 lb (176.4 kg)   03/26/20 (!) 382 lb (173.3 kg)   02/27/20 (!) 359 lb 12.8 oz (163.2 kg)          EF: 60%  Cardiac Imaging: Echo 9/21/20   Summary   -Limited exam per Diane Arevalo CNP for dilated cardiomyopathy. Patient had a   complete exam in 2/20 at [facility].   -Normal global systolic function with an ejection fraction estimated at 60%. -No obvious regional wall motion abnormalities noted. -Mild concentric left ventricular hypertrophy.   -There is trivial tricuspid regurgitation with a RVSP estimation of 32 mmHg. ST 3/6/20  Marked LV enlargement    Mild reduction in LV systolic function with EF 50%    There is normal isotope uptake at stress and rest. There is no evidence of    myocardial ischemia or scar. Echo at [facility] Jan 2020  Left ventricle: The cavity size is normal. Wall thickness was increased in a pattern of moderate  LVH. Systolic function was severely reduced. No       Activity: at baseline  Can you walk 1-2 blocks or do a moderate amount of house/yard work? Yes    NYHA Class: II     Sodium Restrictions: 2g  Fluid Restrictions: 48-64 oz/day  Sodium and fluid restriction compliance: good    Pt Education: The patient has received education on the following topics: dietary sodium restriction, heart failure medications, the importance of physical activity, symptom management and weight monitoring     ARMANDO Yes Treated: Yes    Past Medical History:   has a past medical history of Cardiomyopathy ([facility] [address] Congestive heart failure (CHF) ([facility] [address] Hypertension, and Hypertensive heart disease. Surgical History:   has a past surgical history that includes back surgery. Social History:   reports that she has quit smoking. She has never used smokeless tobacco. She reports current alcohol use. She reports current drug use. Drug: Marijuana. Family History:   No family history on file. HomeMedications:  Prior to Admission medications    Medication Sig Start Date End Date Taking? Authorizing Provider   torsemide (DEMADEX) 20 MG tablet TAKE 3 TABLETS BY MOUTH EVERY DAY 9/17/20   Carmie Cap APRN - CNP   spironolactone (ALDACTONE) 25 MG tablet TAKE 1 TABLET BY MOUTH EVERY DAY 9/14/20   Carmie Cap, APRN - CNP   hydrALAZINE (APRESOLINE) 50 MG tablet TAKE 1 TABLET BY MOUTH TWICE A DAY 7/9/20   Carmie Cap APRN - CNP   isosorbide mononitrate (IMDUR) 30 MG extended release tablet TAKE 1 TABLET BY MOUTH EVERY DAY 6/17/20   Carmie Cap APRN - CNP   carvedilol (COREG) 25 MG tablet TAKE 1 TABLET BY MOUTH TWICE A DAY WITH MEALS 6/10/20   Carmie Cap APRN - CNP   Aspirin-Acetaminophen-Caffeine (EXCEDRIN EXTRA STRENGTH PO) Take by mouth    Historical Provider, MD   [facility] [address] 1 each by Does not apply route daily 2/27/20   Carmie Gurwidner APRN - CNP        Allergies:  Cefuroxime axetil     ROS:   Review of Systems   Constitutional: Negative for fatigue. Respiratory: Negative. Cardiovascular: Negative. Gastrointestinal: Negative. Genitourinary: Negative. Musculoskeletal: Negative. Skin: Negative. Neurological: Positive for headaches. Hematological: Negative. Psychiatric/Behavioral: Negative. Physical Examination:    Vitals:    09/21/20 0830   BP: 122/80   Site: Left Lower Arm   Position: Sitting   Cuff Size: Large Adult   Pulse: 72   SpO2: 92%   Weight: (!) 375 lb (170.1 kg)   Height: 5' 5\" (1.651 m)           Physical Exam  Constitutional:       Appearance: Normal appearance. She is obese. HENT:      Head: Normocephalic and atraumatic. Eyes:      Extraocular Movements: Extraocular movements intact. Pupils: Pupils are equal, round, and reactive to light. Neck:      Musculoskeletal: Normal range of motion and neck supple. Cardiovascular:      Rate and Rhythm: Normal rate and regular rhythm. Pulses: Normal pulses. Heart sounds: Normal heart sounds. Pulmonary:      Effort: Pulmonary effort is normal.      Breath sounds: Normal breath sounds. Abdominal:      General: There is no distension. Palpations: Abdomen is soft. Musculoskeletal:      Right lower leg: Edema present. Skin:     General: Skin is warm and dry. Neurological:      General: No focal deficit present. Mental Status: She is alert and oriented to person, place, and time. Mental status is at baseline. Psychiatric:         Mood and Affect: Mood normal.         Behavior: Behavior normal.         Thought Content:  Thought content normal.         Judgment: Judgment normal.         Lab Data:    CBC:   Lab Results   Component Value Date    WBC 11.7 01/30/2020    WBC 7.7 01/28/2019    WBC 7.6 01/27/2019    RBC 5.26 01/30/2020    RBC 4.96 01/28/2019    RBC 4.96 01/27/2019    HGB 15.1 01/30/2020    HGB 13.2 01/28/2019    HGB 13.3 01/27/2019    HCT 48.2 01/30/2020    HCT 42.8 01/28/2019    HCT 42.3 01/27/2019    MCV 91.5 01/30/2020    MCV 86.2 01/28/2019    MCV 85.2 01/27/2019    RDW 16.5 01/30/2020    RDW 18.2 01/28/2019    RDW 17.9 01/27/2019     01/30/2020     01/28/2019     01/27/2019     BMP:  Lab Results   Component Value Date     06/08/2020     03/06/2020     01/30/2020    K 4.7 06/08/2020    K 4.2 03/06/2020    K 5.4 01/30/2020    K 4.9 02/16/2019    K 3.6 01/21/2019     06/08/2020     03/06/2020     01/30/2020    CO2 33 06/08/2020    CO2 30 03/06/2020    CO2 24 01/30/2020    PHOS 5.1 01/28/2019    PHOS 5.6 01/27/2019    PHOS 4.8 01/26/2019    BUN 18 06/08/2020    BUN 22 03/06/2020    BUN 16 01/30/2020    CREATININE 2.0 06/08/2020    CREATININE 1.5 03/06/2020    CREATININE 1.2 01/30/2020     BNP:   Lab Results   Component Value Date    PROBNP 2,242 06/08/2020    PROBNP 573 03/06/2020    PROBNP 233 02/16/2019     Iron Studies:  No components found for: FE,  TIBC,  FERRITIN  Iron Deficiency Anemia:  No    IV Iron Therapy:  No  2017 ACC/AHA HF Guidelines:   intravenous iron replacement in patients with New York Heart Association (NYHA) class II and III HF and iron deficiency (ferritin <100 ng/ml or 100-300 ng/ml if transferrin saturation <20%), to improve functional status and QoL. Assessment/Plan:    1. Dilated cardiomyopathy ([facility] [address] - continue coreg, imdur and hydralazine, armando   2. Hypertension, essential - improved   3. Systolic CHF, chronic ([address] - compensated, labs today         Instructions:   1. Medications: continue current medication  2. Labs: today  3. Lifestyle Recommendations: Weigh yourself every day in the morning after urination, call [place] if wt increases 2-3lb in one day or 5lb in one week, Limit sodium to 2000mg/day and fluids to 2L or 64oz/day. 2. Follow up:6months      [facility] Resource Line: [phone]      I appreciate the opportunity of cooperating in the care of this individual.    Maddy Dumont CNP, 9/18/2020,9:11 AM    QUALITY MEASURES  1.  Tobacco

## 2020-09-21 ENCOUNTER — OFFICE VISIT (OUTPATIENT)
Dept: CARDIOLOGY CLINIC | Age: 50
End: 2020-09-21
Payer: COMMERCIAL

## 2020-09-21 ENCOUNTER — HOSPITAL ENCOUNTER (OUTPATIENT)
Dept: NON INVASIVE DIAGNOSTICS | Age: 50
Discharge: HOME | End: 2020-09-21
Payer: COMMERCIAL

## 2020-09-21 ENCOUNTER — HOSPITAL ENCOUNTER (OUTPATIENT)
Age: 50
Discharge: HOME | End: 2020-09-21
Payer: COMMERCIAL

## 2020-09-21 ENCOUNTER — TELEPHONE (OUTPATIENT)
Dept: CARDIOLOGY CLINIC | Age: 50
End: 2020-09-21

## 2020-09-21 VITALS
HEIGHT: 65 IN | DIASTOLIC BLOOD PRESSURE: 80 MMHG | SYSTOLIC BLOOD PRESSURE: 122 MMHG | HEART RATE: 72 BPM | OXYGEN SATURATION: 92 % | BODY MASS INDEX: 48.82 KG/M2 | WEIGHT: 293 LBS

## 2020-09-21 LAB
ANION GAP SERPL CALCULATED.3IONS-SCNC: 8 MMOL/L (ref 3–16)
BUN BLDV-MCNC: 24 MG/DL (ref 7–20)
CALCIUM SERPL-MCNC: 9.4 MG/DL (ref 8.3–10.6)
CHLORIDE BLD-SCNC: 101 MMOL/L (ref 99–110)
CO2: 32 MMOL/L (ref 21–32)
CREAT SERPL-MCNC: 1.7 MG/DL (ref 0.6–1.1)
GFR AFRICAN AMERICAN: 39
GFR NON-AFRICAN AMERICAN: 32
GLUCOSE BLD-MCNC: 94 MG/DL (ref 70–99)
POTASSIUM SERPL-SCNC: 4.8 MMOL/L (ref 3.5–5.1)
PRO-BNP: 348 PG/ML (ref 0–124)
SODIUM BLD-SCNC: 141 MMOL/L (ref 136–145)

## 2020-09-21 PROCEDURE — 36415 COLL VENOUS BLD VENIPUNCTURE: CPT

## 2020-09-21 PROCEDURE — 1036F TOBACCO NON-USER: CPT | Performed by: NURSE PRACTITIONER

## 2020-09-21 PROCEDURE — 99214 OFFICE O/P EST MOD 30 MIN: CPT | Performed by: NURSE PRACTITIONER

## 2020-09-21 PROCEDURE — G8417 CALC BMI ABV UP PARAM F/U: HCPCS | Performed by: NURSE PRACTITIONER

## 2020-09-21 PROCEDURE — 83880 ASSAY OF NATRIURETIC PEPTIDE: CPT

## 2020-09-21 PROCEDURE — G8427 DOCREV CUR MEDS BY ELIG CLIN: HCPCS | Performed by: NURSE PRACTITIONER

## 2020-09-21 PROCEDURE — 80048 BASIC METABOLIC PNL TOTAL CA: CPT

## 2020-09-21 PROCEDURE — 93308 TTE F-UP OR LMTD: CPT

## 2020-09-21 ASSESSMENT — ENCOUNTER SYMPTOMS
RESPIRATORY NEGATIVE: 1
GASTROINTESTINAL NEGATIVE: 1

## 2020-09-21 NOTE — PATIENT INSTRUCTIONS
Instructions:   1. Medications: continue current medication  2. Labs: today  3. Lifestyle Recommendations: Weigh yourself every day in the morning after urination, call [facility] if wt increases 2-3lb in one day or 5lb in one week, Limit sodium to 2000mg/day and fluids to 2L or 64oz/day.    1. Follow [address] [place] [address] Street: [phone]

## 2020-09-21 NOTE — TELEPHONE ENCOUNTER
Tried both of the phone numbers patient has listed and both may have been disconnected. Placed a call to patient's mother to try and get a working phone number. Had to leave a message, asking mother to have the patient call us for her test results.

## 2020-09-21 NOTE — TELEPHONE ENCOUNTER
----- Message from NOY Rhoades - CNP sent at 9/21/2020  2:20 PM EDT -----  Labs good, she is getting a little dry, decrease torsemide to 40/20.  Claude Carmona

## 2020-12-17 NOTE — TELEPHONE ENCOUNTER
Prescription refill    Last OV:09/21/20    Last Refill:06/10/20    Labs:09/21/20    Future Appt:none scheduled at this time

## 2020-12-31 NOTE — TELEPHONE ENCOUNTER
Lov 9/21/2020   Labs 9/21/2020 BMP/BNP  Lrf 11/25/2020 Disp 30+1  Appt No appt scheduled please advise thanks.

## 2021-01-01 ENCOUNTER — APPOINTMENT (OUTPATIENT)
Dept: CT IMAGING | Age: 51
DRG: 201 | End: 2021-01-01
Payer: COMMERCIAL

## 2021-01-01 ENCOUNTER — APPOINTMENT (OUTPATIENT)
Dept: GENERAL RADIOLOGY | Age: 51
DRG: 201 | End: 2021-01-01
Payer: COMMERCIAL

## 2021-01-01 ENCOUNTER — TELEPHONE (OUTPATIENT)
Dept: CARDIOLOGY CLINIC | Age: 51
End: 2021-01-01

## 2021-01-01 ENCOUNTER — HOSPITAL ENCOUNTER (INPATIENT)
Age: 51
LOS: 2 days | DRG: 201 | End: 2021-10-05
Attending: STUDENT IN AN ORGANIZED HEALTH CARE EDUCATION/TRAINING PROGRAM | Admitting: INTERNAL MEDICINE
Payer: COMMERCIAL

## 2021-01-01 VITALS
BODY MASS INDEX: 48.82 KG/M2 | TEMPERATURE: 99.9 F | DIASTOLIC BLOOD PRESSURE: 55 MMHG | RESPIRATION RATE: 22 BRPM | HEIGHT: 65 IN | OXYGEN SATURATION: 94 % | WEIGHT: 293 LBS | SYSTOLIC BLOOD PRESSURE: 86 MMHG | HEART RATE: 86 BPM

## 2021-01-01 DIAGNOSIS — R09.02 HYPOXIA: ICD-10-CM

## 2021-01-01 DIAGNOSIS — I46.9 CARDIAC ARREST (HCC): Primary | ICD-10-CM

## 2021-01-01 DIAGNOSIS — N17.9 AKI (ACUTE KIDNEY INJURY) (HCC): ICD-10-CM

## 2021-01-01 DIAGNOSIS — E87.5 HYPERKALEMIA: ICD-10-CM

## 2021-01-01 DIAGNOSIS — E87.20 ACIDOSIS: ICD-10-CM

## 2021-01-01 DIAGNOSIS — D72.829 LEUKOCYTOSIS, UNSPECIFIED TYPE: ICD-10-CM

## 2021-01-01 LAB
ABO/RH: NORMAL
ALBUMIN SERPL-MCNC: 2.9 G/DL (ref 3.4–5)
ALP BLD-CCNC: 87 U/L (ref 40–129)
ALT SERPL-CCNC: 54 U/L (ref 10–40)
AMPHETAMINE SCREEN, URINE: NORMAL
ANION GAP SERPL CALCULATED.3IONS-SCNC: 13 MMOL/L (ref 3–16)
ANION GAP SERPL CALCULATED.3IONS-SCNC: 15 MMOL/L (ref 3–16)
ANION GAP SERPL CALCULATED.3IONS-SCNC: 17 MMOL/L (ref 3–16)
ANION GAP SERPL CALCULATED.3IONS-SCNC: 20 MMOL/L (ref 3–16)
ANTIBODY SCREEN: NORMAL
APTT: 35.1 SEC (ref 26.2–38.6)
APTT: 40.8 SEC (ref 26.2–38.6)
AST SERPL-CCNC: 83 U/L (ref 15–37)
BARBITURATE SCREEN URINE: NORMAL
BASE EXCESS ARTERIAL: -3 (ref -3–3)
BASE EXCESS ARTERIAL: -4 MMOL/L (ref -3–3)
BASE EXCESS ARTERIAL: -4.9 MMOL/L (ref -3–3)
BASE EXCESS ARTERIAL: -5 (ref -3–3)
BASE EXCESS ARTERIAL: -5.4 MMOL/L (ref -3–3)
BASE EXCESS VENOUS: -4.8 MMOL/L (ref -2–3)
BASE EXCESS VENOUS: ABNORMAL MMOL/L (ref -2–3)
BASOPHILS ABSOLUTE: 0.1 K/UL (ref 0–0.2)
BASOPHILS RELATIVE PERCENT: 0.3 %
BENZODIAZEPINE SCREEN, URINE: NORMAL
BILIRUB SERPL-MCNC: 0.8 MG/DL (ref 0–1)
BILIRUBIN DIRECT: 0.4 MG/DL (ref 0–0.3)
BILIRUBIN, INDIRECT: 0.4 MG/DL (ref 0–1)
BUN BLDV-MCNC: 20 MG/DL (ref 7–20)
BUN BLDV-MCNC: 24 MG/DL (ref 7–20)
BUN BLDV-MCNC: 35 MG/DL (ref 7–20)
BUN BLDV-MCNC: 42 MG/DL (ref 7–20)
C DIFF TOXIN/ANTIGEN: NORMAL
CALCIUM IONIZED: 1.03 MMOL/L (ref 1.12–1.32)
CALCIUM IONIZED: 1.11 MMOL/L (ref 1.12–1.32)
CALCIUM SERPL-MCNC: 7.6 MG/DL (ref 8.3–10.6)
CALCIUM SERPL-MCNC: 7.6 MG/DL (ref 8.3–10.6)
CALCIUM SERPL-MCNC: 7.7 MG/DL (ref 8.3–10.6)
CALCIUM SERPL-MCNC: 8.5 MG/DL (ref 8.3–10.6)
CANNABINOID SCREEN URINE: NORMAL
CARBOXYHEMOGLOBIN ARTERIAL: 1.1 % (ref 0–1.5)
CARBOXYHEMOGLOBIN ARTERIAL: 1.2 % (ref 0–1.5)
CARBOXYHEMOGLOBIN ARTERIAL: 1.4 % (ref 0–1.5)
CARBOXYHEMOGLOBIN: 1.1 % (ref 0–1.5)
CARBOXYHEMOGLOBIN: 2.8 % (ref 0–1.5)
CHLORIDE BLD-SCNC: 100 MMOL/L (ref 99–110)
CHLORIDE BLD-SCNC: 101 MMOL/L (ref 99–110)
CHLORIDE BLD-SCNC: 99 MMOL/L (ref 99–110)
CHLORIDE BLD-SCNC: 99 MMOL/L (ref 99–110)
CHLORIDE URINE RANDOM: 74 MMOL/L
CO2: 17 MMOL/L (ref 21–32)
CO2: 20 MMOL/L (ref 21–32)
COCAINE METABOLITE SCREEN URINE: NORMAL
CREAT SERPL-MCNC: 2.3 MG/DL (ref 0.6–1.1)
CREAT SERPL-MCNC: 2.8 MG/DL (ref 0.6–1.1)
CREAT SERPL-MCNC: 3.9 MG/DL (ref 0.6–1.1)
CREAT SERPL-MCNC: 4.9 MG/DL (ref 0.6–1.1)
EKG ATRIAL RATE: 64 BPM
EKG ATRIAL RATE: 72 BPM
EKG ATRIAL RATE: 78 BPM
EKG ATRIAL RATE: 85 BPM
EKG DIAGNOSIS: NORMAL
EKG P AXIS: 44 DEGREES
EKG P AXIS: 45 DEGREES
EKG P AXIS: 50 DEGREES
EKG P AXIS: 53 DEGREES
EKG P-R INTERVAL: 148 MS
EKG P-R INTERVAL: 150 MS
EKG P-R INTERVAL: 156 MS
EKG P-R INTERVAL: 156 MS
EKG Q-T INTERVAL: 450 MS
EKG Q-T INTERVAL: 460 MS
EKG Q-T INTERVAL: 470 MS
EKG Q-T INTERVAL: 538 MS
EKG QRS DURATION: 82 MS
EKG QRS DURATION: 84 MS
EKG QRS DURATION: 84 MS
EKG QRS DURATION: 90 MS
EKG QTC CALCULATION (BAZETT): 492 MS
EKG QTC CALCULATION (BAZETT): 535 MS
EKG QTC CALCULATION (BAZETT): 547 MS
EKG QTC CALCULATION (BAZETT): 555 MS
EKG R AXIS: 123 DEGREES
EKG R AXIS: 126 DEGREES
EKG R AXIS: 127 DEGREES
EKG R AXIS: 135 DEGREES
EKG T AXIS: -20 DEGREES
EKG T AXIS: 149 DEGREES
EKG T AXIS: 178 DEGREES
EKG T AXIS: 192 DEGREES
EKG VENTRICULAR RATE: 64 BPM
EKG VENTRICULAR RATE: 72 BPM
EKG VENTRICULAR RATE: 78 BPM
EKG VENTRICULAR RATE: 85 BPM
EOSINOPHILS ABSOLUTE: 0 K/UL (ref 0–0.6)
EOSINOPHILS RELATIVE PERCENT: 0.1 %
GFR AFRICAN AMERICAN: 11
GFR AFRICAN AMERICAN: 15
GFR AFRICAN AMERICAN: 22
GFR AFRICAN AMERICAN: 27
GFR NON-AFRICAN AMERICAN: 12
GFR NON-AFRICAN AMERICAN: 18
GFR NON-AFRICAN AMERICAN: 23
GFR NON-AFRICAN AMERICAN: 9
GLUCOSE BLD-MCNC: 129 MG/DL (ref 70–99)
GLUCOSE BLD-MCNC: 136 MG/DL (ref 70–99)
GLUCOSE BLD-MCNC: 175 MG/DL (ref 70–99)
GLUCOSE BLD-MCNC: 86 MG/DL (ref 70–99)
GLUCOSE BLD-MCNC: 87 MG/DL (ref 70–99)
GLUCOSE BLD-MCNC: 95 MG/DL (ref 70–99)
HCO3 ARTERIAL: 20.9 MMOL/L (ref 21–29)
HCO3 ARTERIAL: 21 MMOL/L (ref 21–29)
HCO3 ARTERIAL: 22.9 MMOL/L (ref 21–29)
HCO3 ARTERIAL: 23 MMOL/L (ref 21–29)
HCO3 ARTERIAL: 23.6 MMOL/L (ref 21–29)
HCO3 ARTERIAL: 25 MMOL/L (ref 21–29)
HCO3 ARTERIAL: 25.2 MMOL/L (ref 21–29)
HCO3 VENOUS: 23 MMOL/L (ref 24–28)
HCO3 VENOUS: ABNORMAL MMOL/L (ref 24–28)
HCT VFR BLD CALC: 43.1 % (ref 36–48)
HCT VFR BLD CALC: 44.8 % (ref 36–48)
HCT VFR BLD CALC: 47.1 % (ref 36–48)
HEMOGLOBIN, ART, EXTENDED: 14.6 G/DL
HEMOGLOBIN, ART, EXTENDED: 15.1 G/DL
HEMOGLOBIN, ART, EXTENDED: 15.3 G/DL
HEMOGLOBIN, VEN, REDUCED: 27.5 %
HEMOGLOBIN, VEN, REDUCED: 36.9 %
HEMOGLOBIN: 12.8 G/DL (ref 12–16)
HEMOGLOBIN: 14 G/DL (ref 12–16)
HEMOGLOBIN: 14.9 G/DL (ref 12–16)
INR BLD: 1.66 (ref 0.88–1.12)
INR BLD: 2.24 (ref 0.88–1.12)
INR BLD: 3.19 (ref 0.88–1.12)
LACTATE: 3.29 MMOL/L (ref 0.4–2)
LACTATE: 3.52 MMOL/L (ref 0.4–2)
LACTIC ACID, SEPSIS: 8.3 MMOL/L (ref 0.4–1.9)
LACTIC ACID: 3 MMOL/L (ref 0.4–2)
LACTIC ACID: 3.9 MMOL/L (ref 0.4–2)
LACTIC ACID: 4.2 MMOL/L (ref 0.4–2)
LIPASE: 12 U/L (ref 13–60)
LV EF: 63 %
LVEF MODALITY: NORMAL
LYMPHOCYTES ABSOLUTE: 5.6 K/UL (ref 1–5.1)
LYMPHOCYTES RELATIVE PERCENT: 26 %
Lab: NORMAL
MAGNESIUM: 1.9 MG/DL (ref 1.8–2.4)
MAGNESIUM: 2.1 MG/DL (ref 1.8–2.4)
MAGNESIUM: 2.5 MG/DL (ref 1.8–2.4)
MCH RBC QN AUTO: 30 PG (ref 26–34)
MCH RBC QN AUTO: 30 PG (ref 26–34)
MCH RBC QN AUTO: 30.1 PG (ref 26–34)
MCHC RBC AUTO-ENTMCNC: 29.8 G/DL (ref 31–36)
MCHC RBC AUTO-ENTMCNC: 31.3 G/DL (ref 31–36)
MCHC RBC AUTO-ENTMCNC: 31.6 G/DL (ref 31–36)
MCV RBC AUTO: 100.8 FL (ref 80–100)
MCV RBC AUTO: 95 FL (ref 80–100)
MCV RBC AUTO: 95.8 FL (ref 80–100)
METHADONE SCREEN, URINE: NORMAL
METHEMOGLOBIN ARTERIAL: 0.1 % (ref 0–1.4)
METHEMOGLOBIN ARTERIAL: 0.1 % (ref 0–1.4)
METHEMOGLOBIN ARTERIAL: <0 % (ref 0–1.4)
METHEMOGLOBIN VENOUS: 0.1 % (ref 0–1.5)
METHEMOGLOBIN VENOUS: 0.3 % (ref 0–1.5)
MONOCYTES ABSOLUTE: 0.9 K/UL (ref 0–1.3)
MONOCYTES RELATIVE PERCENT: 4 %
NEUTROPHILS ABSOLUTE: 14.9 K/UL (ref 1.7–7.7)
NEUTROPHILS RELATIVE PERCENT: 69.6 %
O2 SAT, ARTERIAL: 79 % (ref 93–100)
O2 SAT, ARTERIAL: 95 % (ref 93–100)
O2 SAT, ARTERIAL: 96 % (ref 93–100)
O2 SAT, ARTERIAL: 96 % (ref 93–100)
O2 SAT, ARTERIAL: 97 % (ref 93–100)
O2 SAT, ARTERIAL: 98 % (ref 93–100)
O2 SAT, ARTERIAL: 99 % (ref 93–100)
O2 SAT, VEN: 62 %
O2 SAT, VEN: 72 %
OPIATE SCREEN URINE: NORMAL
OSMOLALITY URINE: 242 MOSM/KG (ref 390–1070)
OXYCODONE URINE: NORMAL
PCO2 ARTERIAL: 41.8 MM HG (ref 35–45)
PCO2 ARTERIAL: 42.1 MM HG (ref 35–45)
PCO2 ARTERIAL: 45 MMHG (ref 35–45)
PCO2 ARTERIAL: 48.5 MM HG (ref 35–45)
PCO2 ARTERIAL: 51 MMHG (ref 35–45)
PCO2 ARTERIAL: 57.3 MMHG (ref 35–45)
PCO2 ARTERIAL: 61.6 MM HG (ref 35–45)
PCO2, VEN: 52 MMHG (ref 41–51)
PCO2, VEN: >98 MMHG (ref 41–51)
PDW BLD-RTO: 16.6 % (ref 12.4–15.4)
PDW BLD-RTO: 16.9 % (ref 12.4–15.4)
PDW BLD-RTO: 17.7 % (ref 12.4–15.4)
PERFORMED ON: ABNORMAL
PH ARTERIAL: 7.22 (ref 7.35–7.45)
PH ARTERIAL: 7.24 (ref 7.35–7.45)
PH ARTERIAL: 7.26 (ref 7.35–7.45)
PH ARTERIAL: 7.28 (ref 7.35–7.45)
PH ARTERIAL: 7.29 (ref 7.35–7.45)
PH ARTERIAL: 7.31 (ref 7.35–7.45)
PH ARTERIAL: 7.34 (ref 7.35–7.45)
PH UA: 7
PH VENOUS: 6.92 (ref 7.35–7.45)
PH VENOUS: 7.25 (ref 7.35–7.45)
PH VENOUS: 7.28 (ref 7.35–7.45)
PHENCYCLIDINE SCREEN URINE: NORMAL
PHOSPHORUS: 5 MG/DL (ref 2.5–4.9)
PLATELET # BLD: 162 K/UL (ref 135–450)
PLATELET # BLD: 199 K/UL (ref 135–450)
PLATELET # BLD: 231 K/UL (ref 135–450)
PMV BLD AUTO: 7.8 FL (ref 5–10.5)
PMV BLD AUTO: 7.9 FL (ref 5–10.5)
PMV BLD AUTO: 8.1 FL (ref 5–10.5)
PO2 ARTERIAL: 116 MMHG (ref 75–108)
PO2 ARTERIAL: 129 MMHG (ref 75–108)
PO2 ARTERIAL: 53.2 MM HG (ref 75–108)
PO2 ARTERIAL: 80.7 MM HG (ref 75–108)
PO2 ARTERIAL: 86.3 MM HG (ref 75–108)
PO2 ARTERIAL: 94.8 MM HG (ref 75–108)
PO2 ARTERIAL: 97.1 MMHG (ref 75–108)
PO2, VEN: 45 MMHG (ref 25–40)
PO2, VEN: 53.2 MMHG (ref 25–40)
POC POTASSIUM: 4.5 MMOL/L (ref 3.5–5.1)
POC SAMPLE TYPE: ABNORMAL
POC SODIUM: 138 MMOL/L (ref 136–145)
POTASSIUM REFLEX MAGNESIUM: 6.5 MMOL/L (ref 3.5–5.1)
POTASSIUM SERPL-SCNC: 4.1 MMOL/L (ref 3.5–5.1)
POTASSIUM SERPL-SCNC: 4.4 MMOL/L (ref 3.5–5.1)
POTASSIUM SERPL-SCNC: 4.6 MMOL/L (ref 3.5–5.1)
PRO-BNP: ABNORMAL PG/ML (ref 0–124)
PROCALCITONIN: 14.68 NG/ML (ref 0–0.15)
PROPOXYPHENE SCREEN: NORMAL
PROTHROMBIN TIME: 19.1 SEC (ref 9.9–12.7)
PROTHROMBIN TIME: 26.2 SEC (ref 9.9–12.7)
PROTHROMBIN TIME: 37.8 SEC (ref 9.9–12.7)
RBC # BLD: 4.27 M/UL (ref 4–5.2)
RBC # BLD: 4.67 M/UL (ref 4–5.2)
RBC # BLD: 4.96 M/UL (ref 4–5.2)
SARS-COV-2, NAAT: NOT DETECTED
SODIUM BLD-SCNC: 133 MMOL/L (ref 136–145)
SODIUM BLD-SCNC: 136 MMOL/L (ref 136–145)
SODIUM URINE: 79 MMOL/L
TCO2 ARTERIAL: 22 MMOL/L
TCO2 ARTERIAL: 23 MMOL/L
TCO2 ARTERIAL: 24 MMOL/L
TCO2 ARTERIAL: 24 MMOL/L
TCO2 ARTERIAL: 25 MMOL/L
TCO2 ARTERIAL: 26 MMOL/L
TCO2 ARTERIAL: 27 MMOL/L
TCO2 CALC VENOUS: 25 MMOL/L
TCO2 CALC VENOUS: ABNORMAL MMOL/L
TOTAL PROTEIN: 6.3 G/DL (ref 6.4–8.2)
TROPONIN: 0.02 NG/ML
TROPONIN: 0.22 NG/ML
TROPONIN: 0.27 NG/ML
URINE CULTURE, ROUTINE: NORMAL
VANCOMYCIN RANDOM: 19.4 UG/ML
WBC # BLD: 12.2 K/UL (ref 4–11)
WBC # BLD: 18.3 K/UL (ref 4–11)
WBC # BLD: 21.4 K/UL (ref 4–11)

## 2021-01-01 PROCEDURE — 82803 BLOOD GASES ANY COMBINATION: CPT

## 2021-01-01 PROCEDURE — 5A1945Z RESPIRATORY VENTILATION, 24-96 CONSECUTIVE HOURS: ICD-10-PCS | Performed by: STUDENT IN AN ORGANIZED HEALTH CARE EDUCATION/TRAINING PROGRAM

## 2021-01-01 PROCEDURE — 2580000003 HC RX 258: Performed by: STUDENT IN AN ORGANIZED HEALTH CARE EDUCATION/TRAINING PROGRAM

## 2021-01-01 PROCEDURE — 2500000003 HC RX 250 WO HCPCS

## 2021-01-01 PROCEDURE — 3E033XZ INTRODUCTION OF VASOPRESSOR INTO PERIPHERAL VEIN, PERCUTANEOUS APPROACH: ICD-10-PCS | Performed by: STUDENT IN AN ORGANIZED HEALTH CARE EDUCATION/TRAINING PROGRAM

## 2021-01-01 PROCEDURE — 83880 ASSAY OF NATRIURETIC PEPTIDE: CPT

## 2021-01-01 PROCEDURE — 83605 ASSAY OF LACTIC ACID: CPT

## 2021-01-01 PROCEDURE — 2500000003 HC RX 250 WO HCPCS: Performed by: STUDENT IN AN ORGANIZED HEALTH CARE EDUCATION/TRAINING PROGRAM

## 2021-01-01 PROCEDURE — 2000000000 HC ICU R&B

## 2021-01-01 PROCEDURE — 83735 ASSAY OF MAGNESIUM: CPT

## 2021-01-01 PROCEDURE — 94761 N-INVAS EAR/PLS OXIMETRY MLT: CPT

## 2021-01-01 PROCEDURE — 85610 PROTHROMBIN TIME: CPT

## 2021-01-01 PROCEDURE — 84145 PROCALCITONIN (PCT): CPT

## 2021-01-01 PROCEDURE — 93010 ELECTROCARDIOGRAM REPORT: CPT | Performed by: INTERNAL MEDICINE

## 2021-01-01 PROCEDURE — 86900 BLOOD TYPING SEROLOGIC ABO: CPT

## 2021-01-01 PROCEDURE — 85027 COMPLETE CBC AUTOMATED: CPT

## 2021-01-01 PROCEDURE — 2580000003 HC RX 258

## 2021-01-01 PROCEDURE — 94003 VENT MGMT INPAT SUBQ DAY: CPT

## 2021-01-01 PROCEDURE — 99291 CRITICAL CARE FIRST HOUR: CPT | Performed by: INTERNAL MEDICINE

## 2021-01-01 PROCEDURE — 36415 COLL VENOUS BLD VENIPUNCTURE: CPT

## 2021-01-01 PROCEDURE — 87324 CLOSTRIDIUM AG IA: CPT

## 2021-01-01 PROCEDURE — 93306 TTE W/DOPPLER COMPLETE: CPT

## 2021-01-01 PROCEDURE — 93356 MYOCRD STRAIN IMG SPCKL TRCK: CPT

## 2021-01-01 PROCEDURE — 86850 RBC ANTIBODY SCREEN: CPT

## 2021-01-01 PROCEDURE — 84100 ASSAY OF PHOSPHORUS: CPT

## 2021-01-01 PROCEDURE — 94002 VENT MGMT INPAT INIT DAY: CPT

## 2021-01-01 PROCEDURE — 6370000000 HC RX 637 (ALT 250 FOR IP): Performed by: STUDENT IN AN ORGANIZED HEALTH CARE EDUCATION/TRAINING PROGRAM

## 2021-01-01 PROCEDURE — 80048 BASIC METABOLIC PNL TOTAL CA: CPT

## 2021-01-01 PROCEDURE — 6360000004 HC RX CONTRAST MEDICATION: Performed by: STUDENT IN AN ORGANIZED HEALTH CARE EDUCATION/TRAINING PROGRAM

## 2021-01-01 PROCEDURE — 6360000002 HC RX W HCPCS: Performed by: STUDENT IN AN ORGANIZED HEALTH CARE EDUCATION/TRAINING PROGRAM

## 2021-01-01 PROCEDURE — 87086 URINE CULTURE/COLONY COUNT: CPT

## 2021-01-01 PROCEDURE — 80076 HEPATIC FUNCTION PANEL: CPT

## 2021-01-01 PROCEDURE — 87635 SARS-COV-2 COVID-19 AMP PRB: CPT

## 2021-01-01 PROCEDURE — 99291 CRITICAL CARE FIRST HOUR: CPT

## 2021-01-01 PROCEDURE — 84300 ASSAY OF URINE SODIUM: CPT

## 2021-01-01 PROCEDURE — APPNB45 APP NON BILLABLE 31-45 MINUTES: Performed by: NURSE PRACTITIONER

## 2021-01-01 PROCEDURE — 82436 ASSAY OF URINE CHLORIDE: CPT

## 2021-01-01 PROCEDURE — 74177 CT ABD & PELVIS W/CONTRAST: CPT

## 2021-01-01 PROCEDURE — 82570 ASSAY OF URINE CREATININE: CPT

## 2021-01-01 PROCEDURE — 84484 ASSAY OF TROPONIN QUANT: CPT

## 2021-01-01 PROCEDURE — 96374 THER/PROPH/DIAG INJ IV PUSH: CPT

## 2021-01-01 PROCEDURE — 84295 ASSAY OF SERUM SODIUM: CPT

## 2021-01-01 PROCEDURE — 2700000000 HC OXYGEN THERAPY PER DAY

## 2021-01-01 PROCEDURE — 80202 ASSAY OF VANCOMYCIN: CPT

## 2021-01-01 PROCEDURE — 99223 1ST HOSP IP/OBS HIGH 75: CPT | Performed by: NURSE PRACTITIONER

## 2021-01-01 PROCEDURE — 99255 IP/OBS CONSLTJ NEW/EST HI 80: CPT | Performed by: PSYCHIATRY & NEUROLOGY

## 2021-01-01 PROCEDURE — 71275 CT ANGIOGRAPHY CHEST: CPT

## 2021-01-01 PROCEDURE — 0BH17EZ INSERTION OF ENDOTRACHEAL AIRWAY INTO TRACHEA, VIA NATURAL OR ARTIFICIAL OPENING: ICD-10-PCS | Performed by: STUDENT IN AN ORGANIZED HEALTH CARE EDUCATION/TRAINING PROGRAM

## 2021-01-01 PROCEDURE — 82330 ASSAY OF CALCIUM: CPT

## 2021-01-01 PROCEDURE — 37799 UNLISTED PX VASCULAR SURGERY: CPT

## 2021-01-01 PROCEDURE — 71045 X-RAY EXAM CHEST 1 VIEW: CPT

## 2021-01-01 PROCEDURE — 93005 ELECTROCARDIOGRAM TRACING: CPT | Performed by: STUDENT IN AN ORGANIZED HEALTH CARE EDUCATION/TRAINING PROGRAM

## 2021-01-01 PROCEDURE — 83690 ASSAY OF LIPASE: CPT

## 2021-01-01 PROCEDURE — 6370000000 HC RX 637 (ALT 250 FOR IP): Performed by: EMERGENCY MEDICINE

## 2021-01-01 PROCEDURE — 96365 THER/PROPH/DIAG IV INF INIT: CPT

## 2021-01-01 PROCEDURE — 6360000002 HC RX W HCPCS

## 2021-01-01 PROCEDURE — 84132 ASSAY OF SERUM POTASSIUM: CPT

## 2021-01-01 PROCEDURE — 85730 THROMBOPLASTIN TIME PARTIAL: CPT

## 2021-01-01 PROCEDURE — 85025 COMPLETE CBC W/AUTO DIFF WBC: CPT

## 2021-01-01 PROCEDURE — 82947 ASSAY GLUCOSE BLOOD QUANT: CPT

## 2021-01-01 PROCEDURE — 99253 IP/OBS CNSLTJ NEW/EST LOW 45: CPT | Performed by: INTERNAL MEDICINE

## 2021-01-01 PROCEDURE — 87449 NOS EACH ORGANISM AG IA: CPT

## 2021-01-01 PROCEDURE — 96376 TX/PRO/DX INJ SAME DRUG ADON: CPT

## 2021-01-01 PROCEDURE — 99284 EMERGENCY DEPT VISIT MOD MDM: CPT

## 2021-01-01 PROCEDURE — 87040 BLOOD CULTURE FOR BACTERIA: CPT

## 2021-01-01 PROCEDURE — 93005 ELECTROCARDIOGRAM TRACING: CPT

## 2021-01-01 PROCEDURE — 96375 TX/PRO/DX INJ NEW DRUG ADDON: CPT

## 2021-01-01 PROCEDURE — 80307 DRUG TEST PRSMV CHEM ANLYZR: CPT

## 2021-01-01 PROCEDURE — 70450 CT HEAD/BRAIN W/O DYE: CPT

## 2021-01-01 PROCEDURE — 2500000003 HC RX 250 WO HCPCS: Performed by: EMERGENCY MEDICINE

## 2021-01-01 PROCEDURE — 83935 ASSAY OF URINE OSMOLALITY: CPT

## 2021-01-01 PROCEDURE — 86901 BLOOD TYPING SEROLOGIC RH(D): CPT

## 2021-01-01 PROCEDURE — 05HY33Z INSERTION OF INFUSION DEVICE INTO UPPER VEIN, PERCUTANEOUS APPROACH: ICD-10-PCS | Performed by: STUDENT IN AN ORGANIZED HEALTH CARE EDUCATION/TRAINING PROGRAM

## 2021-01-01 RX ORDER — DEXTROSE MONOHYDRATE 25 G/50ML
12.5 INJECTION, SOLUTION INTRAVENOUS PRN
Status: DISCONTINUED | OUTPATIENT
Start: 2021-01-01 | End: 2021-10-06 | Stop reason: HOSPADM

## 2021-01-01 RX ORDER — DEXTROSE MONOHYDRATE 25 G/50ML
25 INJECTION, SOLUTION INTRAVENOUS ONCE
Status: DISCONTINUED | OUTPATIENT
Start: 2021-01-01 | End: 2021-01-01 | Stop reason: SDUPTHER

## 2021-01-01 RX ORDER — CLOPIDOGREL BISULFATE 75 MG/1
75 TABLET ORAL DAILY
Status: DISCONTINUED | OUTPATIENT
Start: 2021-01-01 | End: 2021-10-06 | Stop reason: HOSPADM

## 2021-01-01 RX ORDER — NICOTINE POLACRILEX 4 MG
15 LOZENGE BUCCAL PRN
Status: DISCONTINUED | OUTPATIENT
Start: 2021-01-01 | End: 2021-01-01

## 2021-01-01 RX ORDER — ASPIRIN 81 MG/1
81 TABLET, CHEWABLE ORAL DAILY
Status: DISCONTINUED | OUTPATIENT
Start: 2021-01-01 | End: 2021-10-06 | Stop reason: HOSPADM

## 2021-01-01 RX ORDER — ATORVASTATIN CALCIUM 80 MG/1
80 TABLET, FILM COATED ORAL NIGHTLY
Status: DISCONTINUED | OUTPATIENT
Start: 2021-01-01 | End: 2021-10-06 | Stop reason: HOSPADM

## 2021-01-01 RX ORDER — HYDRALAZINE HYDROCHLORIDE 50 MG/1
TABLET, FILM COATED ORAL
Qty: 180 TABLET | Refills: 1 | OUTPATIENT
Start: 2021-01-01

## 2021-01-01 RX ORDER — ONDANSETRON 4 MG/1
4 TABLET, ORALLY DISINTEGRATING ORAL EVERY 8 HOURS PRN
Status: DISCONTINUED | OUTPATIENT
Start: 2021-01-01 | End: 2021-10-06 | Stop reason: HOSPADM

## 2021-01-01 RX ORDER — ACETAMINOPHEN 325 MG/1
650 TABLET ORAL EVERY 6 HOURS PRN
Status: DISCONTINUED | OUTPATIENT
Start: 2021-01-01 | End: 2021-10-06 | Stop reason: HOSPADM

## 2021-01-01 RX ORDER — POLYETHYLENE GLYCOL 3350 17 G/17G
17 POWDER, FOR SOLUTION ORAL DAILY PRN
Status: DISCONTINUED | OUTPATIENT
Start: 2021-01-01 | End: 2021-10-06 | Stop reason: HOSPADM

## 2021-01-01 RX ORDER — EPINEPHRINE 0.1 MG/ML
1 SYRINGE (ML) INJECTION PRN
Status: DISCONTINUED | OUTPATIENT
Start: 2021-01-01 | End: 2021-01-01

## 2021-01-01 RX ORDER — SODIUM CHLORIDE 0.9 % (FLUSH) 0.9 %
5-40 SYRINGE (ML) INJECTION EVERY 12 HOURS SCHEDULED
Status: DISCONTINUED | OUTPATIENT
Start: 2021-01-01 | End: 2021-10-06 | Stop reason: HOSPADM

## 2021-01-01 RX ORDER — DEXTROSE MONOHYDRATE 50 MG/ML
100 INJECTION, SOLUTION INTRAVENOUS PRN
Status: DISCONTINUED | OUTPATIENT
Start: 2021-01-01 | End: 2021-01-01 | Stop reason: SDUPTHER

## 2021-01-01 RX ORDER — TORSEMIDE 20 MG/1
20 TABLET ORAL DAILY
Qty: 90 TABLET | Refills: 1 | Status: SHIPPED | OUTPATIENT
Start: 2021-01-01

## 2021-01-01 RX ORDER — DEXTROSE MONOHYDRATE 25 G/50ML
25 INJECTION, SOLUTION INTRAVENOUS ONCE
Status: COMPLETED | OUTPATIENT
Start: 2021-01-01 | End: 2021-01-01

## 2021-01-01 RX ORDER — SODIUM CHLORIDE 9 MG/ML
25 INJECTION, SOLUTION INTRAVENOUS PRN
Status: DISCONTINUED | OUTPATIENT
Start: 2021-01-01 | End: 2021-10-06 | Stop reason: HOSPADM

## 2021-01-01 RX ORDER — SPIRONOLACTONE 25 MG/1
25 TABLET ORAL DAILY
Qty: 90 TABLET | Refills: 1 | Status: SHIPPED | OUTPATIENT
Start: 2021-01-01 | End: 2021-01-01

## 2021-01-01 RX ORDER — ONDANSETRON 2 MG/ML
4 INJECTION INTRAMUSCULAR; INTRAVENOUS EVERY 6 HOURS PRN
Status: DISCONTINUED | OUTPATIENT
Start: 2021-01-01 | End: 2021-01-01

## 2021-01-01 RX ORDER — SPIRONOLACTONE 25 MG/1
TABLET ORAL
Qty: 30 TABLET | Refills: 1 | Status: SHIPPED | OUTPATIENT
Start: 2021-01-01 | End: 2021-01-01 | Stop reason: SDUPTHER

## 2021-01-01 RX ORDER — CALCIUM CHLORIDE 100 MG/ML
1000 INJECTION INTRAVENOUS; INTRAVENTRICULAR PRN
Status: DISCONTINUED | OUTPATIENT
Start: 2021-01-01 | End: 2021-01-01

## 2021-01-01 RX ORDER — DEXTROSE MONOHYDRATE 25 G/50ML
12.5 INJECTION, SOLUTION INTRAVENOUS PRN
Status: DISCONTINUED | OUTPATIENT
Start: 2021-01-01 | End: 2021-01-01 | Stop reason: SDUPTHER

## 2021-01-01 RX ORDER — CARVEDILOL 25 MG/1
TABLET ORAL
Qty: 180 TABLET | Refills: 3 | Status: SHIPPED | OUTPATIENT
Start: 2021-01-01

## 2021-01-01 RX ORDER — ISOSORBIDE MONONITRATE 30 MG/1
1 TABLET, EXTENDED RELEASE ORAL DAILY
Status: CANCELLED | OUTPATIENT
Start: 2021-01-01

## 2021-01-01 RX ORDER — HYDRALAZINE HYDROCHLORIDE 50 MG/1
TABLET, FILM COATED ORAL
Qty: 60 TABLET | Refills: 5 | Status: SHIPPED | OUTPATIENT
Start: 2021-01-01

## 2021-01-01 RX ORDER — ISOSORBIDE MONONITRATE 30 MG/1
TABLET, EXTENDED RELEASE ORAL
Qty: 90 TABLET | Refills: 3 | Status: SHIPPED | OUTPATIENT
Start: 2021-01-01

## 2021-01-01 RX ORDER — SODIUM CHLORIDE 0.9 % (FLUSH) 0.9 %
5-40 SYRINGE (ML) INJECTION PRN
Status: DISCONTINUED | OUTPATIENT
Start: 2021-01-01 | End: 2021-10-06 | Stop reason: HOSPADM

## 2021-01-01 RX ORDER — ONDANSETRON 2 MG/ML
4 INJECTION INTRAMUSCULAR; INTRAVENOUS EVERY 6 HOURS PRN
Status: DISCONTINUED | OUTPATIENT
Start: 2021-01-01 | End: 2021-10-06 | Stop reason: HOSPADM

## 2021-01-01 RX ORDER — SPIRONOLACTONE 25 MG/1
TABLET ORAL
Qty: 30 TABLET | Refills: 1 | Status: SHIPPED | OUTPATIENT
Start: 2021-01-01 | End: 2021-01-01

## 2021-01-01 RX ORDER — NICOTINE POLACRILEX 4 MG
15 LOZENGE BUCCAL PRN
Status: DISCONTINUED | OUTPATIENT
Start: 2021-01-01 | End: 2021-01-01 | Stop reason: SDUPTHER

## 2021-01-01 RX ORDER — ONDANSETRON 4 MG/1
4 TABLET, ORALLY DISINTEGRATING ORAL EVERY 8 HOURS PRN
Status: DISCONTINUED | OUTPATIENT
Start: 2021-01-01 | End: 2021-01-01

## 2021-01-01 RX ORDER — SPIRONOLACTONE 25 MG/1
1 TABLET ORAL DAILY
Status: DISCONTINUED | OUTPATIENT
Start: 2021-01-01 | End: 2021-01-01

## 2021-01-01 RX ORDER — CHLORHEXIDINE GLUCONATE 0.12 MG/ML
15 RINSE ORAL 2 TIMES DAILY
Status: DISCONTINUED | OUTPATIENT
Start: 2021-01-01 | End: 2021-01-01

## 2021-01-01 RX ORDER — TORSEMIDE 20 MG/1
20 TABLET ORAL DAILY
Qty: 90 TABLET | Refills: 1 | Status: SHIPPED | OUTPATIENT
Start: 2021-01-01 | End: 2021-01-01 | Stop reason: SDUPTHER

## 2021-01-01 RX ORDER — DEXTROSE MONOHYDRATE 50 MG/ML
100 INJECTION, SOLUTION INTRAVENOUS PRN
Status: DISCONTINUED | OUTPATIENT
Start: 2021-01-01 | End: 2021-10-06 | Stop reason: HOSPADM

## 2021-01-01 RX ORDER — ACETAMINOPHEN 650 MG/1
650 SUPPOSITORY RECTAL EVERY 6 HOURS PRN
Status: DISCONTINUED | OUTPATIENT
Start: 2021-01-01 | End: 2021-10-06 | Stop reason: HOSPADM

## 2021-01-01 RX ADMIN — SODIUM BICARBONATE 50 MEQ: 84 INJECTION, SOLUTION INTRAVENOUS at 17:50

## 2021-01-01 RX ADMIN — PIPERACILLIN AND TAZOBACTAM 3375 MG: 3; .375 INJECTION, POWDER, LYOPHILIZED, FOR SOLUTION INTRAVENOUS at 05:58

## 2021-01-01 RX ADMIN — NOREPINEPHRINE BITARTRATE 14 MCG/MIN: 1 INJECTION, SOLUTION INTRAVENOUS at 23:12

## 2021-01-01 RX ADMIN — CALCIUM CHLORIDE 1000 MG: 100 INJECTION, SOLUTION INTRAVENOUS at 18:00

## 2021-01-01 RX ADMIN — FAMOTIDINE 20 MG: 10 INJECTION, SOLUTION INTRAVENOUS at 09:05

## 2021-01-01 RX ADMIN — VASOPRESSIN 0.04 UNITS/MIN: 20 INJECTION INTRAVENOUS at 08:09

## 2021-01-01 RX ADMIN — NOREPINEPHRINE BITARTRATE 28 MCG/MIN: 1 INJECTION, SOLUTION INTRAVENOUS at 22:46

## 2021-01-01 RX ADMIN — PIPERACILLIN AND TAZOBACTAM 3375 MG: 3; .375 INJECTION, POWDER, LYOPHILIZED, FOR SOLUTION INTRAVENOUS at 11:40

## 2021-01-01 RX ADMIN — Medication 10 ML: at 20:22

## 2021-01-01 RX ADMIN — IOPAMIDOL 80 ML: 755 INJECTION, SOLUTION INTRAVENOUS at 20:58

## 2021-01-01 RX ADMIN — NOREPINEPHRINE BITARTRATE 10 MCG/MIN: 1 INJECTION, SOLUTION, CONCENTRATE INTRAVENOUS at 18:10

## 2021-01-01 RX ADMIN — CALCIUM CHLORIDE 1000 MG: 100 INJECTION, SOLUTION INTRAVENOUS at 17:40

## 2021-01-01 RX ADMIN — CALCIUM CHLORIDE 1000 MG: 100 INJECTION, SOLUTION INTRAVENOUS at 18:10

## 2021-01-01 RX ADMIN — VASOPRESSIN 0.03 UNITS/MIN: 20 INJECTION INTRAVENOUS at 00:12

## 2021-01-01 RX ADMIN — CHLORHEXIDINE GLUCONATE 15 ML: 1.2 RINSE ORAL at 22:21

## 2021-01-01 RX ADMIN — SODIUM BICARBONATE 50 MEQ: 84 INJECTION, SOLUTION INTRAVENOUS at 18:05

## 2021-01-01 RX ADMIN — EPINEPHRINE 10 MCG/MIN: 1 INJECTION INTRAMUSCULAR; INTRAVENOUS; SUBCUTANEOUS at 17:51

## 2021-01-01 RX ADMIN — PIPERACILLIN AND TAZOBACTAM 3375 MG: 3; .375 INJECTION, POWDER, LYOPHILIZED, FOR SOLUTION INTRAVENOUS at 13:08

## 2021-01-01 RX ADMIN — INSULIN HUMAN 10 UNITS: 100 INJECTION, SOLUTION PARENTERAL at 19:01

## 2021-01-01 RX ADMIN — VASOPRESSIN 0.04 UNITS/MIN: 20 INJECTION INTRAVENOUS at 16:15

## 2021-01-01 RX ADMIN — CEFEPIME 2000 MG: 2 INJECTION, POWDER, FOR SOLUTION INTRAMUSCULAR; INTRAVENOUS at 19:20

## 2021-01-01 RX ADMIN — FAMOTIDINE 20 MG: 10 INJECTION, SOLUTION INTRAVENOUS at 07:59

## 2021-01-01 RX ADMIN — NOREPINEPHRINE BITARTRATE 19 MCG/MIN: 1 INJECTION, SOLUTION INTRAVENOUS at 08:09

## 2021-01-01 RX ADMIN — EPINEPHRINE 1 MG: 0.1 INJECTION, SOLUTION ENDOTRACHEAL; INTRACARDIAC; INTRAVENOUS at 17:52

## 2021-01-01 RX ADMIN — EPINEPHRINE 1 MG: 0.1 INJECTION, SOLUTION ENDOTRACHEAL; INTRACARDIAC; INTRAVENOUS at 18:00

## 2021-01-01 RX ADMIN — Medication 10 ML: at 00:00

## 2021-01-01 RX ADMIN — Medication 20 ML: at 09:06

## 2021-01-01 RX ADMIN — PIPERACILLIN AND TAZOBACTAM 3375 MG: 3; .375 INJECTION, POWDER, LYOPHILIZED, FOR SOLUTION INTRAVENOUS at 04:12

## 2021-01-01 RX ADMIN — NOREPINEPHRINE BITARTRATE 10 MCG/MIN: 1 INJECTION, SOLUTION INTRAVENOUS at 21:30

## 2021-01-01 RX ADMIN — DEXTROSE MONOHYDRATE 25 G: 25 INJECTION, SOLUTION INTRAVENOUS at 19:21

## 2021-01-01 RX ADMIN — PIPERACILLIN AND TAZOBACTAM 3375 MG: 3; .375 INJECTION, POWDER, LYOPHILIZED, FOR SOLUTION INTRAVENOUS at 20:24

## 2021-01-01 RX ADMIN — EPINEPHRINE 1 MG: 0.1 INJECTION, SOLUTION ENDOTRACHEAL; INTRACARDIAC; INTRAVENOUS at 18:05

## 2021-01-01 RX ADMIN — EPINEPHRINE 1 MG: 0.1 INJECTION, SOLUTION ENDOTRACHEAL; INTRACARDIAC; INTRAVENOUS at 17:40

## 2021-01-01 ASSESSMENT — PULMONARY FUNCTION TESTS
PIF_VALUE: 31
PIF_VALUE: 30
PIF_VALUE: 29
PIF_VALUE: 31
PIF_VALUE: 28
PIF_VALUE: 31
PIF_VALUE: 30
PIF_VALUE: 31
PIF_VALUE: 30
PIF_VALUE: 28
PIF_VALUE: 29
PIF_VALUE: 32
PIF_VALUE: 30
PIF_VALUE: 32
PIF_VALUE: 31
PIF_VALUE: 30
PIF_VALUE: 32
PIF_VALUE: 31
PIF_VALUE: 33
PIF_VALUE: 31
PIF_VALUE: 28
PIF_VALUE: 32
PIF_VALUE: 32
PIF_VALUE: 30
PIF_VALUE: 28
PIF_VALUE: 29
PIF_VALUE: 32
PIF_VALUE: 32
PIF_VALUE: 30
PIF_VALUE: 31
PIF_VALUE: 31
PIF_VALUE: 28
PIF_VALUE: 31
PIF_VALUE: 31
PIF_VALUE: 29
PIF_VALUE: 30
PIF_VALUE: 29
PIF_VALUE: 30
PIF_VALUE: 31
PIF_VALUE: 30
PIF_VALUE: 30
PIF_VALUE: 31
PIF_VALUE: 29
PIF_VALUE: 31
PIF_VALUE: 29
PIF_VALUE: 31
PIF_VALUE: 30
PIF_VALUE: 31
PIF_VALUE: 32
PIF_VALUE: 28
PIF_VALUE: 32
PIF_VALUE: 31
PIF_VALUE: 29
PIF_VALUE: 30
PIF_VALUE: 31
PIF_VALUE: 32
PIF_VALUE: 32
PIF_VALUE: 27
PIF_VALUE: 30
PIF_VALUE: 31
PIF_VALUE: 32
PIF_VALUE: 31
PIF_VALUE: 30
PIF_VALUE: 27
PIF_VALUE: 34
PIF_VALUE: 31
PIF_VALUE: 31

## 2021-01-01 ASSESSMENT — PAIN SCALES - GENERAL
PAINLEVEL_OUTOF10: 0

## 2021-01-18 NOTE — TELEPHONE ENCOUNTER
Prescription refill    Last OV:09/21/20    Last Refill:11/25/20    Labs:09/21/20    Future Appt:none scheduled at this time

## 2021-01-26 NOTE — TELEPHONE ENCOUNTER
Received refill request for spironolactone from [facility] pharmacy.     Last ov:9/21/2020 NPKV    Last labs:bnp, bmp 9/21/2020    Last Refill:1/4/2021 #30 with 1 refill    Next appointment:on recall list with Claudia Martinez

## 2021-10-03 PROBLEM — I46.9 CARDIAC ARREST (HCC): Status: ACTIVE | Noted: 2021-01-01

## 2021-10-03 NOTE — ED PROVIDER NOTES
[address] [facility] RESIDENT NOTE       Date of evaluation: 10/3/2021    Chief Complaint     Cardiac Arrest      History of Present Illness     Emy Awad is a 54 y.o. female with a PMHx of CHF who presents to the Emergency Department c/o cardiac arrest.    Patient presents to the emergency department via EMS with compressions ongoing. Witnessed to have an arrest, no bystander CPR. Approximately 20 minutes before EMS arrived. Another 30 to 35 minutes with EMS there. Compressions ongoing. I gel and IO placed. Epi x5 given. She arrives here with compressions ongoing unresponsive. Review of Systems     Positive for cardiac arrest.  Unable to obtain additional ROS given patient's clinical acuity    Past Medical, Surgical, Family, and Social History     She has a past medical history of Cardiomyopathy ([address] Congestive heart failure (CHF) ([facility] [address] Hypertension, and Hypertensive heart disease. She has a past surgical history that includes back surgery. Her family history is not on file. She reports that she has quit smoking. She has never used smokeless tobacco. She reports current alcohol use. She reports current drug use. Drug: Marijuana. Medications     Previous Medications    ASPIRIN-ACETAMINOPHEN-CAFFEINE (EXCEDRIN EXTRA STRENGTH PO)    Take by mouth    BLOOD PRESSURE MONITORING (SPHYGMOMANOMETER) MISC    1 each by Does not apply route daily    CARVEDILOL (COREG) 25 MG TABLET    TAKE 1 TABLET BY MOUTH TWICE A DAY WITH MEALS    HYDRALAZINE (APRESOLINE) 50 MG TABLET    TAKE 1 TABLET BY MOUTH TWICE A DAY    ISOSORBIDE MONONITRATE (IMDUR) 30 MG EXTENDED RELEASE TABLET    TAKE 1 TABLET BY MOUTH EVERY DAY    SPIRONOLACTONE (ALDACTONE) 25 MG TABLET    TAKE 1 TABLET BY MOUTH EVERY DAY    TORSEMIDE (DEMADEX) 20 MG TABLET    Take 1 tablet by mouth daily       Allergies     She is allergic to cefuroxime axetil.     Physical Exam     INITIAL VITALS: BP: (!) 64/34,  , Pulse: 67, Resp: 27, SpO2: (!) 88 %     General: Morbidly obese female, no movement. Compressions ongoing.     HEENT:  Normocephalic, atraumatic     Eyes: Anicteric, pupils fixed and dilated    Neck: Supple, excess soft tissue    Pulmonary:   Breath sounds present bilaterally    Cardiac: No palpable pulse    Abdomen: Obese    Extremities: Cool and dry    Skin: No rash    Neuro:   No movement    Psych:   Unable to assess      Diagnostic Results        RADIOLOGY:  XR CHEST PORTABLE    (Results Pending)   CTA PULMONARY W CONTRAST    (Results Pending)   CT Head WO Contrast    (Results Pending)   CT ABDOMEN PELVIS W IV CONTRAST Additional Contrast? None    (Results Pending)       LABS:   Results for orders placed or performed during the hospital encounter of 10/03/21   CBC Auto Differential   Result Value Ref Range    WBC 21.4 (H) 4.0 - 11.0 K/uL    RBC 4.27 4.00 - 5.20 M/uL    Hemoglobin 12.8 12.0 - 16.0 g/dL    Hematocrit 43.1 36.0 - 48.0 %    .8 (H) 80.0 - 100.0 fL    MCH 30.1 26.0 - 34.0 pg    MCHC 29.8 (L) 31.0 - 36.0 g/dL    RDW 17.7 (H) 12.4 - 15.4 %    Platelets 548 670 - 177 K/uL    MPV 7.8 5.0 - 10.5 fL    Neutrophils % 69.6 %    Lymphocytes % 26.0 %    Monocytes % 4.0 %    Eosinophils % 0.1 %    Basophils % 0.3 %    Neutrophils Absolute 14.9 (H) 1.7 - 7.7 K/uL    Lymphocytes Absolute 5.6 (H) 1.0 - 5.1 K/uL    Monocytes Absolute 0.9 0.0 - 1.3 K/uL    Eosinophils Absolute 0.0 0.0 - 0.6 K/uL    Basophils Absolute 0.1 0.0 - 0.2 K/uL   Basic Metabolic Panel w/ Reflex to MG   Result Value Ref Range    Sodium 136 136 - 145 mmol/L    Potassium reflex Magnesium 6.5 (HH) 3.5 - 5.1 mmol/L    Chloride 99 99 - 110 mmol/L    CO2 17 (L) 21 - 32 mmol/L    Anion Gap 20 (H) 3 - 16    Glucose 175 (H) 70 - 99 mg/dL    BUN 20 7 - 20 mg/dL    CREATININE 2.3 (H) 0.6 - 1.1 mg/dL    GFR Non-African American 23 (A) >60    GFR  27 (A) >60    Calcium 7.7 (L) 8.3 - 10.6 mg/dL   Hepatic Function Panel   Result Value Ref Range    Total Protein 6.3 (L) 6.4 - 8.2 g/dL    Albumin 2.9 (L) 3.4 - 5.0 g/dL    Alkaline Phosphatase 87 40 - 129 U/L    ALT 54 (H) 10 - 40 U/L    AST 83 (H) 15 - 37 U/L    Total Bilirubin 0.8 0.0 - 1.0 mg/dL    Bilirubin, Direct 0.4 (H) 0.0 - 0.3 mg/dL    Bilirubin, Indirect 0.4 0.0 - 1.0 mg/dL   Lipase   Result Value Ref Range    Lipase 12.0 (L) 13.0 - 60.0 U/L   Troponin   Result Value Ref Range    Troponin 0.02 (H) <0.01 ng/mL   Brain Natriuretic Peptide   Result Value Ref Range    Pro-BNP 25,954 (H) 0 - 124 pg/mL   Protime-INR   Result Value Ref Range    Protime 19.1 (H) 9.9 - 12.7 sec    INR 1.66 (H) 0.88 - 1.12   Lactate, Sepsis   Result Value Ref Range    Lactic Acid, Sepsis 8.3 (HH) 0.4 - 1.9 mmol/L   Blood Gas, Venous   Result Value Ref Range    pH, Robert 6.915 (LL) 7.350 - 7.450    pCO2, Robert >98.0 (H) 41.0 - 51.0 mmHg    pO2, Robert 53.2 (H) 25 - 40 mmHg    HCO3, Venous cannot calc (AA) 24.0 - 28.0 mmol/L    Base Excess, Robert cannot calc (AA) -2.0 - 3.0 mmol/L    O2 Sat, Robert 62 Not established %    Carboxyhemoglobin 2.8 (H) 0.0 - 1.5 %    MetHgb, Robert 0.3 0.0 - 1.5 %    TC02 (Calc), Robert cannot calc (AA) mmol/L    Hemoglobin, Robert, Reduced 36.90 %   Magnesium   Result Value Ref Range    Magnesium 2.50 (H) 1.80 - 2.40 mg/dL   TYPE AND SCREEN   Result Value Ref Range    ABO/Rh A POS     Antibody Screen NEG          RECENT VITALS:  BP: (!) 46/37,  , Pulse: 55, Resp: 16, SpO2: 94 %     Procedures     Procedures    ED Course     Nursing Notes, Past Medical Hx, Past Surgical Hx, Social Hx, Allergies, and Family Hx were reviewed. The patient was given the following medications:  Orders Placed This Encounter   Medications    EPINEPHrine (EPINEPHrine HCL) 5 mg in dextrose 5 % 250 mL infusion    norepinephrine (LEVOPHED) 16,000 mcg in dextrose 5 % 250 mL infusion    vancomycin (VANCOCIN) 2,000 mg in dextrose 5 % 500 mL IVPB     Order Specific Question:   Antimicrobial Indications     Answer:    Other     Order Specific Question:   Other Abx Indication     Answer:   post rosc leukocytosis    cefepime (MAXIPIME) 2000 mg IVPB minibag     Order Specific Question:   Antimicrobial Indications     Answer: Other     Order Specific Question:   Other Abx Indication     Answer:   post rosc infxn    OR Linked Order Group     ondansetron (ZOFRAN-ODT) disintegrating tablet 4 mg     ondansetron (ZOFRAN) injection 4 mg    chlorhexidine (PERIDEX) 0.12 % solution 15 mL    EPINEPHrine 1 MG/10ML injection 1 mg    calcium chloride 10 % injection 1,000 mg    sodium bicarbonate 8.4 % injection 50 mEq    DISCONTD: insulin regular (HUMULIN R;NOVOLIN R) injection 10 Units    dextrose 50 % IV solution    DISCONTD: glucose (GLUTOSE) 40 % oral gel 15 g    dextrose 50 % IV solution    DISCONTD: glucagon (rDNA) injection 1 mg    dextrose 5 % solution    AND Linked Order Group     insulin regular (HUMULIN R;NOVOLIN R) injection 10 Units     dextrose 50 % IV solution    glucose (GLUTOSE) 40 % oral gel 15 g    dextrose 50 % IV solution    glucagon (rDNA) injection 1 mg    dextrose 5 % solution       CONSULTS:  IP CONSULT TO HOSPITALIST  IP CONSULT TO CRITICAL CARE  IP CONSULT TO CRITICAL CARE    MEDICAL DECISION MAKING / ASSESSMENT / Dipika Kramer is a 54 y.o. female with a history and presentation as described above in HPI. The patient was evaluated by myself and the ED Attending Physician, Dr. Carolynn Rivas MD. All management and disposition plans were discussed and agreed upon. Appropriate labs and diagnostic studies were reviewed as they were made available. Pertinent laboratory studies in medical decision making are listed below. Upon presentation, the patient was evaluated by me. She presents under compressions. We continued compressions here. After an additional round of compressions along with additional 1 of epi, we obtained ROSC. Crash central and arterial lines were placed. She was intubated by me. Please see the separate procedure note. Postarrest EKG showed no STEMI    While she was here in the emergency department, we did lose pulses for period of time after this, ROSC was again obtained after 1 round of CPR and epi. She was given 2 A of bicarb and 2 A of calcium by me as well. She is placed on as needed levo drip to maintain maps above 55, she is at 20+ of both these drips. She has significant hyperkalemia, we treated this with calcium. She has an acute kidney injury. She has a significant metabolic and respiratory acidosis. Given her leukocytosis along with this, I elected to give her antibiotics. We will obtain a medical pan scan including CT head, CT chest, CT abdomen before she goes up to the ICU. I updated family on her room neurologic and medical prognosis. Additionally, I discussed with the resident ICU team, the intensivist, and the hospitalist.     At this time the patient has been admitted to the ICU for further evaluation and management of cardiac arrest. The patient will continue to be monitored here in the emergency department until which time she is moved to her new treatment location. Clinical Impression     1. Cardiac arrest ([facility] [address]    2. ANJELICA (acute kidney injury) ([facility] [address]    3. Acidosis    4. Hyperkalemia    5. Hypoxia    6. Leukocytosis, unspecified type        Disposition     PATIENT REFERRED TO:  No follow-up provider specified.     DISCHARGE MEDICATIONS:  New Prescriptions    No medications on file       DISPOSITION Decision To Admit 10/03/2021 06:40:10 PM          Corinna Bardales MD  Resident  10/03/21 1964

## 2021-10-03 NOTE — PROCEDURES
Endotracheal Intubation Procedure Note  Indication for endotracheal intubation: respiratory failure  Sedation: None  Paralytic: None  Lidocaine: yes  Atropine: yes  Equipment: Soham 3 Glidescope laryngoscope blade. Cricoid Pressure: no  Number of attempts: 2  ETT location confirmed by by CXR. Patient Name: Skye Cox [place]anson Record Number: [id number]  Date: 10/3/2021   Time: 6:25 PM   Room/Bed: 1TR/1TR-01       Central Line Placement Procedure Note  Indication: vascular access    Consent: Unable to be obtained due to the emergent nature of this procedure. Procedure: The patient was positioned appropriately and the skin over the left femoral vein was unable to be prepped due to the emergent nature of the procedure. Local anesthesia was not performed due to the emergent nature of this procedure. A large bore needle was used to identify the vein. A guide wire was then inserted into the vein through the needle. A triple lumen catheter was then inserted into the vessel over the guide wire using the Seldinger technique. All ports showed good, free flowing blood return and were flushed with saline solution. The catheter was then securely fastened to the skin with sutures and covered with a sterile dressing. A post procedure X-ray was not indicated. The patient tolerated the procedure well. Complications: None         Patient Name: [address] [place] [address] Record Number: [id number]  Date: 10/3/2021   Time: 6:25 PM   Room/Bed: 1TR/1TR-01  Arterial Line Placement Procedure Note                   Indication: Need for serial blood work, metabolic derangement, arterial blood gases and mechanical ventilation    Consent: Unable to be obtained due to the emergent nature of this procedure. Emerson's Test: Was not performed    Procedure: The skin over the right femoral artery was unable to be prepped due to the emergent nature of the procedure.   Local anesthesia was not performed due to the emergent nature of this procedure. An 18 gauge angiocath was then inserted, using a modified Seldinger technique, into the vessel. The transducer set was then attached and securely fastened to the skin with sutures. Waveforms on the monitor were observed and found to be adequate. The patient had good distal perfusion after the procedure. The site was then dressed in a sterile fashion. The patient tolerated the procedure well.      Complications: None

## 2021-10-03 NOTE — H&P
history on file. Review of Systems   Unable to perform ROS: Intubated   All other systems reviewed and are negative. ROS: A 10 point review of systems was conducted, significant findings as noted in HPI. Physical exam:  Constitutional:       Appearance: Normal appearance. She is obese and unresponsive with pupil dilation. HENT:      Head: Normocephalic and atraumatic. Neck:      Musculoskeletal: Normal range of motion and neck supple. Cardiovascular:      Rate and Rhythm: regular rhythm. Bradycardic       Pulses: Normal pulses. Heart sounds: Normal heart sounds. Pulmonary:      Effort: Pulmonary effort is normal.      Breath sounds: Normal breath sounds. Abdominal:      General: There is no distension. Palpations: Abdomen is soft. Musculoskeletal:      Right lower leg: Edema present. +2 pitting bilateral lower extremity   Skin:     General: Skin is warm and dry. Pupils dilated and non reactive      Vitals:    10/03/21 1934   BP:    Pulse: 66   Resp:    SpO2: 100%       DATA:    Labs:  CBC:   Recent Labs     10/03/21  1802   WBC 21.4*   HGB 12.8   HCT 43.1          BMP:   Recent Labs     10/03/21  1802      K 6.5*   CL 99   CO2 17*   BUN 20   CREATININE 2.3*   GLUCOSE 175*     LFT's:   Recent Labs     10/03/21  1802   AST 83*   ALT 54*   BILITOT 0.8   ALKPHOS 87     Troponin:   Recent Labs     10/03/21  1802   TROPONINI 0.02*     BNP:No results for input(s): BNP in the last 72 hours. ABGs: No results for input(s): PHART, EHK1TQB, PO2ART in the last 72 hours. INR:   Recent Labs     10/03/21  1802   INR 1.66*       U/A:No results for input(s): NITRITE, COLORU, PHUR, LABCAST, WBCUA, RBCUA, MUCUS, TRICHOMONAS, YEAST, BACTERIA, CLARITYU, SPECGRAV, LEUKOCYTESUR, UROBILINOGEN, BILIRUBINUR, BLOODU, GLUCOSEU, AMORPHOUS in the last 72 hours. Invalid input(s): KETONESU    XR CHEST PORTABLE   Final Result      Stable cardiomegaly.    Right perihilar opacities may represent pulmonary edema. CTA PULMONARY W CONTRAST    (Results Pending)   CT Head WO Contrast    (Results Pending)   CT ABDOMEN PELVIS W IV CONTRAST Additional Contrast? None    (Results Pending)           ASSESSMENT AND PLAN:  Emy Montanez is a 54 y.o. female with a PMHx of CHF,HTN comes in to the ED for cardiac arrest.      Cardiac arrest 2/2 nonischemic HFrEF  (20% recovered to 55% then dropped to 35%)  Pt was found down for an unknown amount of time and was given CPR for 30 min with x5 epi. Pt achieved ROSC and then had another cardiac arrest from bradycardia. Epi, bicarb and calcium was given and achieved ROSC after pulse check. Pt has a significant history of CHF. PRO BNP- [id number]. As per chart review- questionable medication compliance. Previous admission for SOB 2/2 acute CHF on 6/24/2021. Grade 2 diastolic dysfunction. 6/21/2021 TTE  - Left ventricle: The cavity size is normal. Wall thickness is normal. Systolic function was severely reduced. The estimated ejection fraction was in the range of 20% to 25%. Diffuse hypokinesis with regional variation    3/10/2020 Nuclear stress    Marked LV enlargement    Mild reduction in LV systolic function with EF 50%    There is normal isotope uptake at stress and rest.    6/24/2021 RHC  IMPRESSIONS: Systolic heart failure, volume overload, elevated  systemic vascular resistance; low output state. -NYHA Class: II             -Repeat Echo   -Trend trops  -Aspirin 81mg  -Plavix  - Assess cardiac pacemaker to see the root cause of the Asystole  -Lipitor 80 mg   -Fluid restriction - currently NPO  -CT head - Anoxic brain injury  -Pan scan to assess further damage   -No hypothermic protocol       Possible sepsis   Elevated Cr, transaminase with severe hypotension and elevated WBC count and lactate.   -Vanc and cefepime started in ED   -Levo and Epi ggt   -CTA- Bilateral perihilar and peripheral areas of consolidation representing a combination of pulmonary edema, atelectasis, and possible aspiration. Anion Gap Met Acidosis   AG of 20; bicarb 17,CO2 >100 on VBG. -Dextrose 5 fluids given  -Na bicarb given during CPR  -Pt intubated 10/3    Hyperkalemia   Pt admitted with K 6.5. Repeat K with POC.   -Daily RFP  -Calcium carbonate   -Insulin/D5 to drive K back into the cells     ANJELICA   Cr on 2.3 baseline 1.6.  Hx of UTI with ecoli.   -avoid nephrotoxic agents  -Urine studies sent out for etiology     HTN  -Hold bp medications as they are soft since cardiac arrest    Obesity BMI >60  - pt on risk and benefits of weight loss    Will discuss with attending physician     Code Status:Full code  FEN: NPO  PPX: Lovenox   DISPO: ICU    Ofilia MD Edna  10/3/2021,  7:43 PM

## 2021-10-03 NOTE — ED PROVIDER NOTES
ED Attending Attestation Note     Date of evaluation: 10/3/2021    This patient was seen by the resident. I have seen and examined the patient, agree with the workup, evaluation, management and diagnosis. The care plan has been discussed. I have reviewed the ECG and concur with the resident's interpretation. My assessment reveals female patient brought in with cardiac arrest by EMS. EMS notes that family saw her around 4 PM and then found her down around 5 PM.  There is no bystander CPR. Unclear how long she had been down. EMS initially had asystole and gave her 5 rounds of epinephrine and worked for for approximately 25 minutes prior to arrival to [facility] she converted to PEA during that time. On arrival, patient was under compressions and in cardiac arrest.  At our first pulse check we found a pulse. Blood work was obtained the patient was critically ill with a low blood pressure. We switched the igel airway for an ET tube. Were able to place crash femoral arterial and central venous line access. She was placed on epinephrine and Levophed. She was also given empiric calcium and bicarb for her PEA. No STEMI on postarrest EKG. Patient then had another episode of cardiac arrest when she started to become more bradycardic. She was worked from that around and given more epinephrine, bicarb, calcium with ROSC after first pulse check. We will obtain a medical pan scan, patient is requiring significant pressor doses, fluids were given as well. She will be covered with empiric antibiotics given elevated white count and elevated lactate. She will require ICU admission. I was present for the central line, arterial line, intubation performed by the resident physician. Critical Care:  Due to the immediate potential for life-threatening deterioration due to cardiac arrest, respiratory failure, hyperkalemia, I spent 64 minutes providing critical care.   This time excludes time spent performing procedures but includes time spent on direct patient care, managing a difficult airway, ACLS and hyperkalemia treatment, titrating ventilator, post ROSC treatment, pressor titration history retrieval, review of the chart, and discussions with patient, family, and consultant(s).        Gail Seo MD  10/03/21 5751

## 2021-10-04 PROBLEM — G93.1 ANOXIC BRAIN INJURY (HCC): Status: ACTIVE | Noted: 2021-01-01

## 2021-10-04 NOTE — CONSULTS
Neurology consult Note    Dr. Luis Enrique Bailey requesting this consult. CC: Coma    HPI:    Vivek Zuñiga is a 54 y.o. female with a past medical history of CHF s/p monitor placement who presented to the hospital after her family saw her unconscious one hour after last being seen well. About 20 minutes after this, EMS performed CPR for 30-35 minutes before arriving to the ED, where an I gel and IO was placed, and the patient regained spontanous circulation after one round of CPR and a round of epi. She once again lost pulses and another round of CPR and epi resulted in ROSC again. 2 amps of bicarb and 2 amps of calcium were given, and she was placed on a levo drip.        Past Medical History:   Diagnosis Date    Cardiomyopathy [facility]) 02/2015    Nonischemic    Congestive heart failure (CHF) (HCC)     Combined systolic and diastolic    Hypertension     Hypertensive heart disease      Past Surgical History:   Procedure Laterality Date    BACK SURGERY         HOME MEDICATIONS:  Medications Prior to Admission: spironolactone (ALDACTONE) 25 MG tablet, TAKE 1 TABLET BY MOUTH EVERY DAY  torsemide (DEMADEX) 20 MG tablet, Take 1 tablet by mouth daily  carvedilol (COREG) 25 MG tablet, TAKE 1 TABLET BY MOUTH TWICE A DAY WITH MEALS  isosorbide mononitrate (IMDUR) 30 MG extended release tablet, TAKE 1 TABLET BY MOUTH EVERY DAY  hydrALAZINE (APRESOLINE) 50 MG tablet, TAKE 1 TABLET BY MOUTH TWICE A DAY  Aspirin-Acetaminophen-Caffeine (EXCEDRIN EXTRA STRENGTH PO), Take by mouth  Blood Pressure Monitoring (SPHYGMOMANOMETER) MISC, 1 each by Does not apply route daily    CURRENT MEDICATIONS:    Current Facility-Administered Medications:     piperacillin-tazobactam (ZOSYN) 3,375 mg in dextrose 5 % 50 mL IVPB extended infusion (mini-bag), 3,375 mg, IntraVENous, Q8H, Mehdi Lawrence MD, Last Rate: 12.5 mL/hr at 10/04/21 1308, 3,375 mg at 10/04/21 1308    vancomycin (VANCOCIN) intermittent dosing (placeholder), , Other, See Admin Instructions, Kian Tyler MD    dextrose 50 % IV solution, 12.5 g, IntraVENous, PRN, Kian Tyler MD    dextrose 5 % solution, 100 mL/hr, IntraVENous, PRN, Kian Tyler MD    sodium chloride flush 0.9 % injection 5-40 mL, 5-40 mL, IntraVENous, 2 times per day, Kian Tyler MD, 10 mL at 10/04/21 0000    sodium chloride flush 0.9 % injection 5-40 mL, 5-40 mL, IntraVENous, PRN, Kain Tyler MD    0.9 % sodium chloride infusion, 25 mL, IntraVENous, PRN, Kian Tyler MD    ondansetron (ZOFRAN-ODT) disintegrating tablet 4 mg, 4 mg, Oral, Q8H PRN **OR** ondansetron (ZOFRAN) injection 4 mg, 4 mg, IntraVENous, Q6H PRN, Kian Tyler MD    polyethylene glycol (GLYCOLAX) packet 17 g, 17 g, Oral, Daily PRN, Kian Tyler MD    acetaminophen (TYLENOL) tablet 650 mg, 650 mg, Oral, Q6H PRN **OR** acetaminophen (TYLENOL) suppository 650 mg, 650 mg, Rectal, Q6H PRN, Kian Tyler MD    perflutren lipid microspheres (DEFINITY) injection 1.65 mg, 1.5 mL, IntraVENous, ONCE PRN, Kian Tyler MD    famotidine (PEPCID) injection 20 mg, 20 mg, IntraVENous, Daily, Kian Tyler MD, 20 mg at 10/04/21 1847    aspirin chewable tablet 81 mg, 81 mg, Oral, Daily, Kian Tyler MD    clopidogrel (PLAVIX) tablet 75 mg, 75 mg, Oral, Daily, Kian Tyler MD    atorvastatin (LIPITOR) tablet 80 mg, 80 mg, Oral, Nightly, Kian Tyler MD    norepinephrine (LEVOPHED) 16,000 mcg in dextrose 5 % 250 mL infusion, 2-100 mcg/min, IntraVENous, Continuous, Benja Pierre MD, Last Rate: 18.8 mL/hr at 10/04/21 0736, 20 mcg/min at 10/04/21 0736      ROS:   SERGIO due to patient's mental status    Constitutional  BP (!) 91/57   Pulse 78   Temp 98.3 °F (36.8 °C) (Axillary)   Resp 17   Ht 5' 5\" (1.651 m)   Wt (!) 375 lb (170.1 kg)   LMP  (LMP Unknown)   SpO2 96%   BMI 62.40 kg/m²       PHYSICAL EXAMINATION:    Heart[de-identified] S1 S2; No M/R/G  Lungs: CTAB/L  Abdomen: Soft NTND  Extremities: No Edema  Skin: No Rash      NEUROLOGICAL EXAMINATION     Comatose  E1 VT M1  Pupils R 5mm L 5 mm Midposition and Fixed  Absent Corneals  Absent OCR  Absent Facial Movement to Noxious Stimuli at TMJ/SON  No Cough or Gag  No Spontaneous Respirations  Flaccid x4 Extremities  Areflexic  No Response (of any type) to NBP x4 Extremities      Images:  CT head wo contrast:   Diffuse cerebral edema and severe global hypoxic ischemic injury. Assessment:  54year old woman who presented in cardiac arrest with prolonged time down in the field. Her CT yesterday already showed total sulcal effacement and loss of gray-white differentiation. HPI, imaging, and exam consistent with catastrophic neurologic injury that is non-survivable.      Plan:  -Palliative care consult  -We can assist with brain death exam, if needed    NOY Arroyo-CNP  Neurology & Neurocritical Care   Neurology Line: [phone]  Perfect[employer]: [facility] NPs

## 2021-10-04 NOTE — PROGRESS NOTES
Comprehensive Nutrition Assessment    RECOMMENDATIONS:  1. PO Diet: NPO     NUTRITION ASSESSMENT:   Type and Reason for Visit:      Nutritional summary & status:      Patient admitted d/t cardiac arrest    PMH significant for history of nonischemic cardiomyopathy and CHF

## 2021-10-04 NOTE — PROGRESS NOTES
Progress Note  Admit Date: 10/3/2021       Pt seen and examined on ventilator   Scheduled Medications:    piperacillin-tazobactam  3,375 mg IntraVENous Q8H    vancomycin (VANCOCIN) intermittent dosing (placeholder)   Other See Admin Instructions    sodium chloride flush  5-40 mL IntraVENous 2 times per day    famotidine (PEPCID) injection  20 mg IntraVENous Daily    aspirin  81 mg Oral Daily    clopidogrel  75 mg Oral Daily    atorvastatin  80 mg Oral Nightly      PRN Medications: dextrose, dextrose, sodium chloride flush, sodium chloride, ondansetron **OR** ondansetron, polyethylene glycol, acetaminophen **OR** acetaminophen, perflutren lipid microspheres  Diet: Diet NPO    Continuous Infusions:   dextrose      sodium chloride      norepinephrine 13 mcg/min (10/04/21 1520)       PHYSICAL EXAM:  BP (!) 91/57   Pulse 78   Temp 98.3 °F (36.8 °C) (Axillary)   Resp 20   Ht 5' 5\" (1.651 m)   Wt (!) 375 lb (170.1 kg)   LMP  (LMP Unknown)   SpO2 95%   BMI 62.40 kg/m²   Recent Labs     10/03/21  2242   POCGLU 136*       Intake/Output Summary (Last 24 hours) at 10/4/2021 1542  Last data filed at 10/4/2021 1520  Gross per 24 hour   Intake 2268.86 ml   Output 675 ml   Net 1593.86 ml       Physical Exam  Constitutional:       Appearance: She is obese. She is ill-appearing. HENT:      Head: Normocephalic. Right Ear: External ear normal.      Left Ear: External ear normal.      Nose: Nose normal.   Eyes:      Comments: Pupils are dilated with no observed constriction to light   Cardiovascular:      Rate and Rhythm: Normal rate and regular rhythm. Pulses: Normal pulses. Heart sounds: No murmur heard. No friction rub. No gallop. Pulmonary:      Breath sounds: Wheezing present. Comments: Expiratory wheezing present on ventilator  Abdominal:      Palpations: Abdomen is soft. There is no mass. Tenderness: There is no abdominal tenderness. There is no guarding.    Musculoskeletal: Right lower leg: Edema present. Left lower leg: Edema present. Comments: 2+ bilateral pitting edema in LE. Skin:     General: Skin is warm. Coloration: Skin is not pale. Findings: No bruising or lesion. Neurological:      Comments: Patient is intubated without sedation. Not responding to pain or vocal cues. Psychiatric:      Comments: Unable to assess     LABS:  Recent Labs     10/03/21  1802 10/04/21  0645   WBC 21.4* 18.3*   HGB 12.8 14.9   HCT 43.1 47.1    199                                                                    Recent Labs     10/03/21  1802 10/04/21  0048    133*   K 6.5* 4.1   CL 99 100   CO2 17* 20*   BUN 20 24*   CREATININE 2.3* 2.8*   GLUCOSE 175* 129*     Recent Labs     10/03/21  1802   AST 83*   ALT 54*   BILITOT 0.8   ALKPHOS 87     Recent Labs     10/03/21  1802 10/04/21  0048 10/04/21  0647   TROPONINI 0.02* 0.22* 0.27*     No results for input(s): BNP in the last 72 hours. No results for input(s): CHOL, HDL in the last 72 hours.     Invalid input(s): LDLCALCU  Recent Labs     10/03/21  1802 10/04/21  0645   INR 1.66* 2.24*       Assessment & Plan:    Patient Active Problem List:     Hypertension, essential     ANJELICA (acute kidney injury) ([facility] [address]     Chest pain     Cardiomyopathy due to hypertension (HCC)     Chronic combined systolic and diastolic heart failure (HCC)     Swelling of right lower extremity     BMI 60.0-69.9, adult ([facility] [address]     Myocardiopathy (HCC)     Renal insufficiency     Non-compliance     Morbid obesity with body mass index (BMI) of 60.0 to 69.9 in adult ([facility] [address]     Hypersomnia     Obstructive sleep apnea (adult) (pediatric)     Cardiac arrest ([facility] [address]     Anoxic brain injury ([facility] [address]      53 yo admitted with anoxic brain injury cardiac arrest secondary to non ischemic cardiomyopathy,   Palliative care consulted goal sof care to be discussed, appreciate neuro input  Continue treatment for possible sepsis from aspiration pneumonia, but patient has prior cardiac history cause of asystole not entirely clear. Hyperkalemia may be either a cause of result, however, will need to continue to monitor, however, the brain injury carries a poor prognosis. The patient and / or the family were informed of the results of any tests, a time was given to answer questions, a plan was proposed and they agreed with plan.   Disposition:   Full Code      MD Ming Ruiz

## 2021-10-04 NOTE — CARE COORDINATION
Case Management Assessment           Initial Evaluation                Date / Time of Evaluation: 10/4/2021 3:02 PM                 Assessment Completed by: Amanda Miguel RN    Patient Name: Scottie Loza     YOB: 1970  Diagnosis: Acidosis [E87.2]  Cardiac arrest ([facility] [address] [I46.9]  Hyperkalemia [E87.5]  Hypoxia [R09.02]  ANJELICA (acute kidney injury) ([facility] [address] [N17.9]  Leukocytosis, unspecified type [D72.829]     Date / Time: 10/3/2021  5:34 PM  Patient found down in cardiac arrest.     Presentation to [facility] ED: Pt mother was contacted and updated on the patients prognosis. Per mother- Emy has no legal spouse and has 7 children from the ages of 22to 5years of age. Mother gave us the number for the POA who is [place] is patients aunt [phone]. POA was contacted and updated about patients current status, prognosis and anoxic brain injury noted on brain imagining. Per POA, she wanted to discuss with family as what to do for the next steps on her treatment and code status. Negative COVID resulted last evening 10/4/21    Palliative Care has been following this case throughout stay. They have been meeting with parents and other family members throughout. Patient's progress has been discussed and Palliative care has been speaking to family about code status. Patient's prognosis poor. Anoxic brain injury. At 5:01 pm Jelena Sauceda RN noted that: Both eldest daughters at bedside, want to make face time calls with family before proceeding with end of life decisions. Okay not draw BMP and other ordered labs per MD.      Patient Admission Status: Inpatient    If patient is discharged prior to next notation, then this note serves as note for discharge by case management. Current PCP: No primary care provider on file. Clinic Patient: No    Chart Reviewed: Yes  Attempted to call [address], but no call returned.      Hospitalization in the last 30 days: No    Emergency Contacts:  Extended Emergency Contact Information  Primary Emergency Contact: Cheryl Momin  Home Phone: [phone]  Relation: Parent  Secondary Emergency Contact: Ferny Alberto [facility]  Mobile Phone: [phone]  Relation: Grandparent    Advance Directives:   Code Status: Full Code      Financial  Payor: Mechelle Vanessa / Plan: Frutoso Glatter / Product Type: *No Product type* /     Pre-cert required for SNF: Yes    Pharmacy    [facility] [address] [phone] - f [phone]  [address] [place] [address]  Phone: [phone] Fax: [phone]    [facility] [phone] [facility] [phone]  [address] [place] [address]  Phone: [phone] Fax: [phone]      Potential assistance Purchasing Medications:    Does Patient want to participate in local refill/ meds to beds program?:      Meds To Beds General Rules:  1. Can ONLY be done Monday- Friday between 8:30am-5pm  2. Prescription(s) must be in pharmacy by 3pm to be filled same day  3. Copy of patient's insurance/ prescription drug card and patient face sheet must be sent along with the prescription(s)  4. Cost of Rx cannot be added to hospital bill. If financial assistance is needed, please contact unit  or ;  or  CANNOT provide pharmacy voucher for patients co-pays  5.  Patients can then  the prescription on their way out of the hospital at discharge, or pharmacy can deliver to the bedside if staff is available. (payment due at time of pick-up or delivery - cash, check, or card accepted)     Able to afford home medications/ co-pay costs: Yes    ADLS  Support Systems:      PT AM-PAC:   /24  OT AM-PAC:   /24    [place]: apartment  Steps:   Plans to RETURN to current housing: TBD  Barriers to RETURNING to current housing: medical complications    Home Care Information  Currently ACTIVE with Home Health Care: no  Home Care Agency: [facility]   Phone: [phone]  Fax: [phone] inpast      Durable Medical Equipment  DME Provider: [address] and Respiratory Equipment  Has HOME OXYGEN prior to admission: Yes  [address]: [facility]  Phone: [phone]   Other Respiratory Equipment:       DISCHARGE PLAN:  Disposition: TBD    Transportation PLAN for discharge: TBD     Factors facilitating achievement of predicted outcomes: Family support    Barriers to discharge: Medical complications    Additional Case Management Notes: see above    The Plan for Transition of Care is related to the following treatment goals of Acidosis [E87.2]  Cardiac arrest ([facility] [address] [I46.9]  Hyperkalemia [E87.5]  Hypoxia [R09.02]  ANJELICA (acute kidney injury) ([facility] [address] [N17.9]  Leukocytosis, unspecified type [D72.829]    The Patient and/or patient representative Vena and her family were provided with a choice of provider and agrees with the discharge plan Yes    Freedom of choice list was provided with basic dialogue that supports the patient's individualized plan of care/goals and shares the quality data associated with the providers.  Yes    Care Transition patient: No    Khushboo Bella RN  [facility]  Case Management Department  Ph: [phone]

## 2021-10-04 NOTE — PROGRESS NOTES
Pt admitted from Ed to ICU at 2120 s/p cardiac arrest. Pt unresponsive no gag cough refelex noted pupils dialated & non-reactive RR 16 does not overirde vent set rate. Dr Carlos Molina notified regarding critical CT results of severe brain edema & severe ischemic hypoxic injury. Afebrile HR 80s sinus rhythm BP stable with levophed 10 mcg/min & epi 10 mcg/min spo2 100 at fio2 100% & peep +5. Large watery stool rectal tube inserted.

## 2021-10-04 NOTE — PROGRESS NOTES
F/U ABG results values at current vent setting:AC/PRVC: RR 16/tv 500/fio2 100/peep +5 are: ph 7.2/pco2 57/po2 129/hco3 25, RT & Dr August Lewis notified, set RR upped to 20 fio2 weaned down to 80%.

## 2021-10-04 NOTE — PROGRESS NOTES
Clinical Pharmacy Progress Note    IV Vancomycin - Management by Pharmacy    Consult Date(s): 10/4/21  Consulting Provider(s): Dr. Yudith Rao / Plan    Indication: Aspiration PNA - Vancomycin   Concurrent Antimicrobials: Zosyn - day #1   Day of Vanc Therapy: #2   Current Dosing Method: Intermittent   Therapeutic Goal: 15 - 20 mg/L   Current Dose / Frequency: Intermittent   Plan / Rationale:   o Per night shift RN, patient received vancomycin 2g IV x 1 in the ED, but it wasn't charted on. Unclear what time this may have been given.  o Given current clinical status, will obtain a random vancomycin level tomorrow AM and re-dose if vancomycin therapy is still desired  o Will continue to monitor clinical condition and make adjustments to regimen as appropriate. Thank you for consulting Pharmacy! Issac Beebe PharmD, [facility]  Wireless: [phone]  10/4/2021 2:41 PM      Interval Update: Patient has severe anoxic brain injury. Family meeting planned for this afternoon. Subjective/Objective: Ms. Yumi Vance is a 54 y.o. female with a PMHx significant for CHF,HTN, admitted for possible sepsis. Pharmacy has been consulted to dose vancomycin. Height:   Ht Readings from Last 1 Encounters:   10/04/21 5' 5\" (1.651 m)     Weight:   Wt Readings from Last 1 Encounters:   10/04/21 (!) 375 lb (170.1 kg)       Level(s) / Doses:    Date Time Dose Level / Type of Level Interpretation   10/3 Unknown 2g IV x 1 ---           Note: Serum levels collected for AUC-based dosing may be high if collected in close proximity to the dose administered. This is not necessarily an indicator of toxicity. Cultures & Sensitivities:    Date Site Micro Susceptibility / Result   10/3/21 Blood x2 Ordered    10/3/21 Urine In process    10/4/21 C. diff Negative    10/4/21 COVID-19, rapid Negative      Labs / Ancillary Data:    Estimated Creatinine Clearance: 39 mL/min (A) (based on SCr of 2.8 mg/dL (H)).     Recent Labs 10/03/21  1802 10/04/21  0048 10/04/21  0645   CREATININE 2.3* 2.8*  --    BUN 20 24*  --    WBC 21.4*  --  18.3*

## 2021-10-04 NOTE — PROGRESS NOTES
Incontinent of large amount watery stool. Stool specimen collected & sent for c-diff contact-plus isolation innitiated.

## 2021-10-04 NOTE — PROGRESS NOTES
Famotidine 20 mg BID ordered for patient. This medication is renally eliminated. Will change to famotidine 20 mg daily per renal dose adjustment policy. Estimated Creatinine Clearance: 48 mL/min (A) (based on SCr of 2.3 mg/dL (H)). Pharmacy will continue to monitor renal function and adjust dose as necessary. Please call with any questions. Thanks!   Jenn Dugan [facility]

## 2021-10-04 NOTE — ED PROVIDER NOTES
I was contacted by the staff and CT scan about patient having elevated creatinine with orders for IV contrasted studies. At this point, need for determination of the cause of patient's cardiac arrest outweigh the risk of contrast-induced nephropathy so will recommend continuation of contrast and CT scanning.       Ayesha Del Cid MD  10/03/21 2035

## 2021-10-04 NOTE — PROGRESS NOTES
GCS 3 no brainstem reflexes. Referral made to [facility], awaiting for additional evaluation.

## 2021-10-04 NOTE — PROGRESS NOTES
Clinical Pharmacy Progress Note    IV Vancomycin - Management by Pharmacy    Consult Date(s): 10/4/21  Consulting Provider(s): Dr. Gilbert Foster / Plan    Indication: Aspiration PNA - Vancomycin   Concurrent Antimicrobials: Zosyn   Day of Vanc Therapy: #1   Current Dosing Method: Intermittent   Therapeutic Goal: 15 - 20 mg/L   Current Dose / Frequency: Intermittent   Plan / Rationale:   o Patient received vancomycin 2000 mg IVx1 in ED  o Due to ANJELICA, future doses will be given intermittently based on random levels until ANJELICA resolves  Will continue to monitor clinical condition and make adjustments to regimen as appropriate. Thank you for consulting Pharmacy! Rakesh Quintana [facility]        Subjective/Objective: Ms. Scottie Loza is a 54 y.o. female with a PMHx significant for CHF,HTN, admitted for possible sepsis. Pharmacy has been consulted to dose warfarin. Height:   Ht Readings from Last 1 Encounters:   10/04/21 5' 5\" (1.651 m)     Weight:   Wt Readings from Last 1 Encounters:   10/04/21 (!) 375 lb (170.1 kg)       Level(s) / Doses:    Date Time Dose Level / Type of Level Interpretation                 Note: Serum levels collected for AUC-based dosing may be high if collected in close proximity to the dose administered. This is not necessarily an indicator of toxicity. Cultures & Sensitivities:    Date Site Micro Susceptibility / Result   10/3/21 Blood pending    10/3/21 Urine pending      Labs / Ancillary Data:    Estimated Creatinine Clearance: 39 mL/min (A) (based on SCr of 2.8 mg/dL (H)).     Recent Labs     10/03/21  1802 10/04/21  0048   CREATININE 2.3* 2.8*   BUN 20 24*   WBC 21.4*  --

## 2021-10-04 NOTE — CONSULTS
[facility]  Palliative Medicine Consultation Note      Date Of Admission:10/3/2021  Date of consult: 10/04/21  Seen by [facility] in the past:  No    Recommendations:        Saw pt at the bedside, no visitors present. Pt is not responsive. D/w RN Melina Phalen, who spoke with pt's Mena Ludwig this morning. The pt does not have a HCPOA to the family's knowledge, pt has 7 children, 2 of whom are adults. It was explained that pt's four adult children are her legal NOK. Pt's parents with approval to visit today. Spoke with pt's Mena Ludwig, mother Areli Rodarte and daughter Nora Freeman over the phone. Plan for family meeting via zoom at 2pm today. Met with pt's parents, Areli Rodarte and Elmer Mckee at the bedside, along with pt's aunt Taylor Hutchins, daughters Gely Farrar and Nora Freeman, and grandmother Tomy Cantu over the phone. Dr. Nida Cheng, Dr. Luz Joseph and ISMA Mcadams Found also present. Established that daughters Gely Farrar and Nora Freeman are pt's legal NOK as they are her only children older than 23. Gely Farrar and Matilda express that they are OK with making decisions on the pt's behalf. Discussed their understanding of the pt's medical condition. Discussed poor neurologic status and CT scan showing global hypoxic injury. Explained that pt's chances for recovery are quite poor, near zero. Discussed code status in depth. [facility] that she and Gely Charan have discussed the pt's code status and have come to a decision, pt's father Elmer Mckee requesting to discuss that decision as a family privately prior to making any changes. Marcello Gandhi will meet with the rest of the family this evening to discuss their decision. One family member asked about a brain death exam. Explained the process of brain death exam and that brain death constitutes legal death. Explained that it is possible that the pt is brain dead, and that a brain death exam could be required in the coming days. Family expressed understanding.  Plan to touch base again with Nora Freeman and Dmitry tomorrow to discuss code status and goals of care once they have spoken as a family. Oanh Rivers has the phone number for this NP and ICU if they have any questions or want to make any changes tonight. 1. Goals of Care/Advanced Care planning/Code status: Full Code, pt's daughter Nereyda Martinez and Oanh Rivers are her legal NOK. They want to discuss decisions tonight as a family, appear to be leaning towards a change in code status to [address]. 2. Pain: pt unresponsive, unable to state  3. SOB: mechanically ventilated  4. S/p Cardiac Arrest likely 2/2 HFrEF: Pt was found down for an unknown amount of time and was given CPR for 30 min with x5 epi. Pt achieved ROSC and then had another cardiac arrest from bradycardia. Epi, bicarb and calcium was given and achieved ROSC after pulse check. Prolonged resuscitation. Pt has a significant history of CHF. PRO BNP- [id number]. As per chart review- questionable medication compliance. Previous admission for SOB 2/2 acute CHF on 6/24/2021. Grade 2 diastolic dysfunction. Patient shown to have bilateral 3-6th anterior rib fractures and nondisplaced sternal body fracture likely 2/2 chest compressions. Discussed with family in depth today. 5. Disposition: TBD pending hospital course. Prognosis is poor. Reason for Consult:         [x]  Goals of Care  [x]  Code Status Discussion/Advanced Care Planning   [x]  Psychosocial/Family Support  []  Symptom Management  []  Other (Specify)    Requesting Physician: Dr. Lilian Frank:  S/p cardiac arrest     History Obtained From:  electronic medical record, reason patient could not give history:  altered mental status    History of Present Illness:         Doris Jimenez is a 54 y.o. female with PMH of CHF, HTN who presented after a cardiac arrest. Reportedly pt was in cardiac arrest for around 20 minute prior to initiation of CPR by EMS. EMS provided CPR for around 30 minutes until pt arrive to ED.  Pt was last seen normal at 4pm and was found down at 5pm. Pt initially had asystole and was given 5 rounds of epi. Pt obtained ROSC on her way to the ED but then went back into PEA on arrival. After 1 round of CPR a pulse was found. Pt was severely hypotensive. Central line was placed and a ET tube. Pt had Epi and levo running through the central. No STEMI was seen on post CPR EKG. Pt then had another cardiac arrest when she became bradycardic. Bicarb, calcium, and epi was given and patient obtained ROSC. CT head showed diffuse edema with global hypoxia. Subjective:         Past Medical History:        Diagnosis Date    Cardiomyopathy ([facility] [address] 02/2015    Nonischemic    Congestive heart failure (CHF) (HCC)     Combined systolic and diastolic    Hypertension     Hypertensive heart disease        Past Surgical History:        Procedure Laterality Date    BACK SURGERY         Current Medications:    Medications Prior to Admission: spironolactone (ALDACTONE) 25 MG tablet, TAKE 1 TABLET BY MOUTH EVERY DAY  torsemide (DEMADEX) 20 MG tablet, Take 1 tablet by mouth daily  carvedilol (COREG) 25 MG tablet, TAKE 1 TABLET BY MOUTH TWICE A DAY WITH MEALS  isosorbide mononitrate (IMDUR) 30 MG extended release tablet, TAKE 1 TABLET BY MOUTH EVERY DAY  hydrALAZINE (APRESOLINE) 50 MG tablet, TAKE 1 TABLET BY MOUTH TWICE A DAY  Aspirin-Acetaminophen-Caffeine (EXCEDRIN EXTRA STRENGTH PO), Take by mouth  Blood Pressure Monitoring (SPHYGMOMANOMETER) MISC, 1 each by Does not apply route daily    Allergies:  Cefuroxime axetil    Social History:    · TOBACCO: reports that she has quit smoking. She has never used smokeless tobacco.  · ETOH:   reports current alcohol use. · Patient currently lives with family     Review of Systems -   Review of Systems   Unable to perform ROS: Mental status change       Objective:          Physical Exam  Constitutional:       Appearance: She is ill-appearing. Cardiovascular:      Rate and Rhythm: Normal rate and regular rhythm.       Heart sounds: Normal heart sounds. Pulmonary:      Comments: Mechanically ventilated  Abdominal:      General: Bowel sounds are normal.      Palpations: Abdomen is soft. Musculoskeletal:      Right lower leg: No edema. Left lower leg: No edema. Skin:     General: Skin is warm and dry. Neurological:      Comments: Not on sedation, non responsive to pain or verbal stimuli. Palliative Performance Scale:  [] 60% Ambulation reduced; Significant disease; Can't do hobbies/housework; intake normal or reduced; occasional assist; LOC full/confusion  [] 50% Mainly sit/lie; Extensive disease; Can't do any work; Considerable assist; intake normal  Or reduced; LOC full/confusion  [] 40% Mainly in bed; Extensive disease; Mainly assist; intake normal or reduced; occasional assist; LOC full/confusion  [x] 30% Bed Bound; Extensive disease; Total care; intake reduced; LOC full/confusion  [] 20% Bed Bound; Extensive disease; Total care; intake minimal; Drowsy/coma  [] 10% Bed Bound; Extensive disease; Total care; Mouth care only; Drowsy/coma  [] 0% Death    PPS: 30    Vitals:    BP 93/70   Pulse 82   Temp 98.2 °F (36.8 °C) (Axillary)   Resp 20   Ht 5' 5\" (1.651 m)   Wt (!) 375 lb (170.1 kg)   LMP  (LMP Unknown)   SpO2 96%   BMI 62.40 kg/m²     Labs:    BMP:   Recent Labs     10/03/21  1802 10/04/21  0048    133*   K 6.5* 4.1   CL 99 100   CO2 17* 20*   BUN 20 24*   CREATININE 2.3* 2.8*   GLUCOSE 175* 129*     CBC:   Recent Labs     10/03/21  1802 10/04/21  0645   WBC 21.4* 18.3*   HGB 12.8 14.9   HCT 43.1 47.1    199       LFT's:   Recent Labs     10/03/21  1802   AST 83*   ALT 54*   BILITOT 0.8   ALKPHOS 87     Troponin:   Recent Labs     10/03/21  1802 10/04/21  0048 10/04/21  0647   TROPONINI 0.02* 0.22* 0.27*     BNP: No results for input(s): BNP in the last 72 hours.   ABGs:   Recent Labs     10/04/21  0049 10/04/21  0646   PHART 7.239* 7.258*   EOD5PFL 57.3* 51.0*   PO2ART 129.0* 97.1     INR: Recent Labs     10/03/21  1802 10/04/21  0645   INR 1.66* 2.24*       U/A:  Recent Labs     10/04/21  0039   PHUR 7.0       XR CHEST PORTABLE   Final Result      Stable cardiomegaly and pulmonary edema. CT ABDOMEN PELVIS W IV CONTRAST Additional Contrast? None   Final Result      1. No evidence of pulmonary embolism. 2.  Bilateral perihilar and peripheral areas of consolidation representing a combination of pulmonary edema, atelectasis, and possible aspiration. 3.  Bilateral 3-6th anterior rib fractures and nondisplaced sternal body fracture. 4.  Cardiomegaly with right atrioventricular dilatation. 5.  No acute abnormality in the abdomen and pelvis. CTA PULMONARY W CONTRAST   Final Result      1. No evidence of pulmonary embolism. 2.  Bilateral perihilar and peripheral areas of consolidation representing a combination of pulmonary edema, atelectasis, and possible aspiration. 3.  Bilateral 3-6th anterior rib fractures and nondisplaced sternal body fracture. 4.  Cardiomegaly with right atrioventricular dilatation. 5.  No acute abnormality in the abdomen and pelvis. CT Head WO Contrast   Final Result      1. Diffuse cerebral edema and severe global hypoxic ischemic injury. Discussed with the patient's nurse Luz. XR CHEST PORTABLE   Final Result      Stable cardiomegaly. Right perihilar opacities may represent pulmonary edema. Conclusion/Time spent:         Recommendations see above    Time spent with patient and/or family: 60  Time reviewing records: 10 min   Time communicating with staff: 5 min     A total of 75 minutes spent with the patient and family on unit greater than 50% in counseling regarding palliative care and in goals of care for the patient. Thank you to Dr. Ernesta Romberg for this consultation. We will continue to follow Ms. Rios's care as needed.     [address] [facility]  Inpatient Palliative Care  [phone]

## 2021-10-04 NOTE — CONSULTS
ICU Consult Note    Admit Date: 10/3/2021  Day: 2  Vent Day: Day 2  IV Access:Peripheral, left femoral vein central line and right femoral artery art line  IV Fluids:None  Vasopressors: Levo ggt               Antibiotics: Zosyn, Vanc  Diet: Diet NPO    CC: Cardiac Arrest    Interval history:   NAEO  Pt has come off the Epi and is now just on Norepi for pressors  Vent setting changes overnight: FiO2 100->80%, Rate 16->20  Patient is not responsive to verbal or pain stimuli this morning. On the vent requiring no sedation. Pupils are dilated and not reactive    HPI:  Emy Rios is a 54 y.o. female with a PMHx of CHF,HTN comes in to the ED for cardiac arrest. Pt came in via EMS after having a cardiac arrest approx 20 min before anyone started CPR. EMS did compressions for approx 30 min until he arrived to the ED. Pt was last seen normal at 4pm and was found down at 5pm. Pt initially had asystole and was given 5 rounds of epi. Pt obtained ROSC on her way to the ED but then went back into PEA on arrival. After 1 round of CPR a pulse was found. Pt was severely hypotensive. Central line was placed and a ET tube. Pt had Epi and levo running through the central. No STEMI was seen on post CPR EKG. Pt then had another cardiac arrest when she became bradycardic. Bicarb, calcium, and epi was given and patient obtained ROSC.      On labs patient was noted have hyperkalemia 6.9,ANJELICA Cr 2.3 ,Mg 2.5, Lactate 8.3, Pro BNP 41622, WBC 21.4 ,     Echo: 6/21/2021: The cavity size is normal. Wall thickness is normal. Systolic function was severely reduced. The estimated ejection fraction was in the range of 20% to 25%. Diffuse hypokinesis with regional variations  CXR-Right perihilar opacities - pulmonary edema with cardiomegaly. CT head- diffuse edema with global hypoxia.     Medications:     Scheduled Meds:   piperacillin-tazobactam  3,375 mg IntraVENous Q8H    vancomycin (VANCOCIN) intermittent dosing (placeholder)   Other See Admin Instructions    sodium chloride flush  5-40 mL IntraVENous 2 times per day    famotidine (PEPCID) injection  20 mg IntraVENous Daily    aspirin  81 mg Oral Daily    clopidogrel  75 mg Oral Daily    atorvastatin  80 mg Oral Nightly     Continuous Infusions:   dextrose      sodium chloride      norepinephrine 20 mcg/min (10/04/21 0615)     PRN Meds:dextrose, dextrose, sodium chloride flush, sodium chloride, ondansetron **OR** ondansetron, polyethylene glycol, acetaminophen **OR** acetaminophen, perflutren lipid microspheres    Objective:   Vitals:   T-max:  Patient Vitals for the past 8 hrs:   BP Temp Temp src Pulse Resp SpO2 Height Weight   10/04/21 0615 -- -- -- 82 20 96 % -- --   10/04/21 0600 107/84 -- -- 82 20 97 % -- --   10/04/21 0545 -- -- -- 82 20 96 % -- --   10/04/21 0530 -- -- -- 82 20 97 % -- --   10/04/21 0515 -- -- -- 82 20 96 % -- --   10/04/21 0500 (!) 94/50 -- -- 81 20 97 % -- --   10/04/21 0445 -- -- -- 81 20 97 % -- --   10/04/21 0430 -- -- -- 81 20 97 % -- --   10/04/21 0415 -- -- -- 82 20 97 % -- --   10/04/21 0401 -- -- -- 82 20 98 % -- --   10/04/21 0400 93/71 98 °F (36.7 °C) Axillary 81 15 99 % -- --   10/04/21 0345 -- -- -- 81 16 98 % -- --   10/04/21 0330 -- -- -- 81 16 98 % -- --   10/04/21 0230 -- -- -- 81 16 98 % -- --   10/04/21 0215 -- -- -- 81 16 98 % -- --   10/04/21 0200 98/71 -- -- 81 16 98 % -- --   10/04/21 0145 (!) 67/57 -- -- 78 16 -- -- --   10/04/21 0130 80/65 -- -- 79 16 -- -- --   10/04/21 0115 (!) 66/32 -- -- 78 16 -- -- --   10/04/21 0045 -- -- -- -- -- -- 5' 5\" (1.651 m) (!) 375 lb (170.1 kg)   10/04/21 0030 83/64 -- -- 78 16 -- -- --   10/04/21 0015 86/61 -- -- 78 16 -- -- --   10/04/21 0001 -- -- -- 78 16 97 % -- --   10/04/21 0000 82/66 98.1 °F (36.7 °C) Oral 78 16 -- -- --   10/03/21 2345 (!) 69/52 -- -- 77 16 97 % -- --   10/03/21 2330 84/62 -- -- 78 16 97 % -- --   10/03/21 2315 90/76 -- -- 79 16 97 % -- --   10/03/21 2300 85/66 -- -- 80 17 97 % -- -- 10/03/21 2254 -- -- -- 81 15 97 % -- --   10/03/21 2253 -- -- -- 80 15 97 % -- --   10/03/21 2245 95/65 -- -- 82 16 97 % -- --   10/03/21 2230 (!) 95/59 -- -- 84 16 98 % -- --       Intake/Output Summary (Last 24 hours) at 10/4/2021 0627  Last data filed at 10/4/2021 0615  Gross per 24 hour   Intake 2063.16 ml   Output 460 ml   Net 1603.16 ml       Review of Systems   Reason unable to perform ROS: Intubated. Physical Exam  Constitutional:       Appearance: She is obese. She is ill-appearing. HENT:      Head: Normocephalic. Right Ear: External ear normal.      Left Ear: External ear normal.      Nose: Nose normal.   Eyes:      Comments: Pupils are dilated with no observed constriction to light   Cardiovascular:      Rate and Rhythm: Normal rate and regular rhythm. Pulses: Normal pulses. Heart sounds: No murmur heard. No friction rub. No gallop. Pulmonary:      Breath sounds: Wheezing present. Comments: Expiratory wheezing present on ventilator  Abdominal:      Palpations: Abdomen is soft. There is no mass. Tenderness: There is no abdominal tenderness. There is no guarding. Musculoskeletal:      Right lower leg: Edema present. Left lower leg: Edema present. Comments: 2+ bilateral pitting edema in LE. Skin:     General: Skin is warm. Coloration: Skin is not pale. Findings: No bruising or lesion. Neurological:      Comments: Patient is intubated without sedation. Not responding to pain or vocal cues.     Psychiatric:      Comments: Unable to assess         LABS:    CBC:   Recent Labs     10/03/21  1802   WBC 21.4*   HGB 12.8   HCT 43.1      .8*     Renal:    Recent Labs     10/03/21  1802 10/04/21  0048    133*   K 6.5* 4.1   CL 99 100   CO2 17* 20*   BUN 20 24*   CREATININE 2.3* 2.8*   GLUCOSE 175* 129*   CALCIUM 7.7* 8.5   MG 2.50* 2.10   PHOS  --  5.0*   ANIONGAP 20* 13     Hepatic:   Recent Labs     10/03/21  1802   AST 83*   ALT 54*   BILITOT 0.8   BILIDIR 0.4*   PROT 6.3*   LABALBU 2.9*   ALKPHOS 87     Troponin:   Recent Labs     10/03/21  1802 10/04/21  0048   TROPONINI 0.02* 0.22*     BNP: No results for input(s): BNP in the last 72 hours. Lipids: No results for input(s): CHOL, HDL in the last 72 hours. Invalid input(s): LDLCALCU, TRIGLYCERIDE  ABGs:    Recent Labs     10/04/21  0049   PHART 7.239*   VSH2GZR 57.3*   PO2ART 129.0*   KRH3PCJ 25   BEART -4.0*   V5DLKNFU 98   OKU5RPM 26       INR:   Recent Labs     10/03/21  1802   INR 1.66*     Lactate: No results for input(s): LACTATE in the last 72 hours. Cultures:  -----------------------------------------------------------------  RAD:   XR CHEST PORTABLE   Final Result      Stable cardiomegaly and pulmonary edema. CT ABDOMEN PELVIS W IV CONTRAST Additional Contrast? None   Final Result      1. No evidence of pulmonary embolism. 2.  Bilateral perihilar and peripheral areas of consolidation representing a combination of pulmonary edema, atelectasis, and possible aspiration. 3.  Bilateral 3-6th anterior rib fractures and nondisplaced sternal body fracture. 4.  Cardiomegaly with right atrioventricular dilatation. 5.  No acute abnormality in the abdomen and pelvis. CTA PULMONARY W CONTRAST   Final Result      1. No evidence of pulmonary embolism. 2.  Bilateral perihilar and peripheral areas of consolidation representing a combination of pulmonary edema, atelectasis, and possible aspiration. 3.  Bilateral 3-6th anterior rib fractures and nondisplaced sternal body fracture. 4.  Cardiomegaly with right atrioventricular dilatation. 5.  No acute abnormality in the abdomen and pelvis. CT Head WO Contrast   Final Result      1. Diffuse cerebral edema and severe global hypoxic ischemic injury. Discussed with the patient's nurse Luz. XR CHEST PORTABLE   Final Result      Stable cardiomegaly.    Right perihilar opacities may represent pulmonary edema. Assessment/Plan:   Emy Rios is a 54 y.o. female with a PMHx of CHF,HTN comes in to the ED for cardiac arrest.     Cardiac arrest 2/2 nonischemic HFrEF  (20% recovered to 55% then dropped to 35%)  Pt was found down for an unknown amount of time and was given CPR for 30 min with x5 epi. Pt achieved ROSC and then had another cardiac arrest from bradycardia. Epi, bicarb and calcium was given and achieved ROSC after pulse check. Prolonged resuscitation. Pt has a significant history of CHF. PRO BNP- [id number]. As per chart review- questionable medication compliance. Previous admission for SOB 2/2 acute CHF on 6/24/2021. Grade 2 diastolic dysfunction. Patient shown to have bilateral 3-6th anterior rib fractures and nondisplaced sternal body fracture likely 2/2 chest compressions. Prior cardiac studies:  6/21/2021 TTE  - Left ventricle: The cavity size is normal. Wall thickness is normal. Systolic function was severely reduced. The estimated ejection fraction was in the range of 20% to 25%. Diffuse hypokinesis with regional variation     3/10/2020 Nuclear stress    Marked LV enlargement    Mild reduction in LV systolic function with EF 50%    There is normal isotope uptake at stress and rest.     6/24/2021 RHC  IMPRESSIONS: Systolic heart failure, volume overload, elevated  systemic vascular resistance; low output state.   - Cardiology consulted, appreciate recs  -NYHA Class: II             -Repeat Echo   -Trend trops, went up from 0.22 to 0.27  -Aspirin 81mg  -Plavix  - Assess cardiac pacemaker to see the root cause of the Asystole  -Lipitor 80 mg   -Fluid restriction - currently NPO  -CT head showed findings consistent with anoxic brain injury  -Pan scan to assess further damage: CTA showed pulmonary edema, atelectasis, and possible aspiration  -No hypothermic protocol      Diffuse cerebral edema and severe global hypoxic ischemic injury   Patient went into cardiac arrest twice, was found down for a prolonged period of time, and resuscitation was prolonged as well. MRI findings suggestive of anoxic brain injury and resulting edema.  - Neurology has not yet been consulted  - Will reach out to family today regarding prognosis and code status  - Palliative care consulted    Sepsis  Septic Shock, possibly 2/2 aspiration pneumonia  Elevated Cr, transaminase with severe hypotension and elevated WBC count and lactate. Radiology findings concerning for possible aspiration pneumonia (vs. pulm edema)  -Vanc and cefepime started in ED, transitioned to vanc and Zosyn  -Levo ggt, off Epi   -CTA- Bilateral perihilar and peripheral areas of consolidation representing a combination of pulmonary edema, atelectasis, and possible aspiration.      Anion Gap Met Acidosis   AG of 20; bicarb 17,CO2 >100 on VBG. -Dextrose 5 fluids given  -Na bicarb given during CPR  -Pt intubated 10/3     Hyperkalemia   Pt admitted with K 6.5. Repeat K with POC.   -Daily RFP  -Calcium carbonate   -Insulin/D5 to drive K back into the cells      ANJELICA   Renal failure lactic acidosis   Cr on 2.3 baseline 1.6. Hx of UTI with ecoli.   -avoid nephrotoxic agents  -Urine studies sent out for etiology      HTN  -Hold bp medications as they are soft since cardiac arrest     Obesity BMI >60  - Unable to  patient on weight loss due to Russell Sandoval MD, PGY-1  10/04/21  6:27 AM    This patient has been staffed and discussed with Dr. Veronica Sanchez       Patient was seen, examined and discussed with Dr. Antonino Morel. I agree with the history of present illness, past medical/surgical histories, family history, social history, medication list and allergies as listed. The review of systems is as noted above. My physical exam confirms the findings listed  Chart was reviewed including labs, CXR, CT scan, EKG and medical records confirm the findings noted     S/p cardiac arrest.  Initial rhythm was asystole.   Had another cardiac arrest in ED  Acute respiratory failure on mechanical vent support. , RR 20, FiO2   80%, PEEP 5  ABG 7.25/51/97. RR increased after this ABG from 16 to 20  Known non ischemic cardiomyopathy with EF of 25%  Anoxic brain injury: CT scan already showing changes of diffuse edema   Cardiogenic shock / septic shock: pressors requirement is improving. Levophed is down to 20  Aspiration pneumonia. On vanc and zosyn   ANJELICA  Tested positive for COVID19 2 weeks ago    There is no cough, no corneal, no pupillary, dolls positive, she is not on sedation. Brief SBT -> she did not breath on 30 sec SBT    She has severe anoxic injury and possibly brain dead  Family meeting at 2:00pm    Pt has a high probability of imminent or life-threatening deterioration requiring close monitoring, and highly complex decision-making and/or interventions of high intensity to assess, manipulate, and support his critical organ systems to prevent a likely inevitable decline which could occur if left untreated.      A total critical care time 45 minutes was used. This includes but not limited to examining patient, collaborating with other physicians, monitoring vital signs, telemetry, continuous pulse oximetry, and clinical response to IV medications, documentation time, review and interpretation of laboratory and radiological data, review of nursing notes and old record review.  This time excludes any time that may have been spent performing procedures for life threatening organ failure.

## 2021-10-04 NOTE — PROGRESS NOTES
Pt mother was contacted and updated on the patients prognosis. Per mother- Emy has no legal spouse and has 7 children from the ages of 22to 5years of age. Mother gave us the number for the POJIMENA who is [place] is patients aunt [phone]. POA was contacted and updated about patients current status, prognosis and anoxic brain injury noted on brain imagining. Per POA, she wanted to discuss with family as what to do for the next steps on her treatment and code status. She will call back in the morning when decision has been made with her and the family.

## 2021-10-04 NOTE — CONSULTS
[facility]  Cardiology Inpatient Consult Service                                                                                          Pt Name: Michael Butler  Age: 54 y.o. Sex: female  : 1970  Location: 60/6281-    Referring Physician: Lidia Rice MD      Reason for Consult:       Reason for Consultation/Chief Complaint: Cardiac arrest      HPI:      Michael Butler is a 54 y.o. female with a past medical history of CHF s/p monitor placement who presented to the hospital after her family saw her unconscious one hour after last being seen well. About 20 minutes after this, EMS performed CPR for 30-35 minutes before arriving to the ED, where an I gel and IO was placed, and the patient regained spontanous circulation after one round of CPR and a round of epi. She once again lost pulses and another round of CPR and epi resulted in ROSC again. 2 amps of bicarb and 2 amps of calcium were given, and she was placed on a levo drip. She was intubated and transferred to the ICU, where she is seen today with likely brain death as seen by head CT. Histories     Past Medical History:   has a past medical history of Cardiomyopathy ([address] Congestive heart failure (CHF) ([facility] [address] Hypertension, and Hypertensive heart disease. Surgical History:   has a past surgical history that includes back surgery. Social History:   reports that she has quit smoking. She has never used smokeless tobacco. She reports current alcohol use. She reports current drug use. Drug: Marijuana. Family History:  No evidence for sudden cardiac death or premature CAD      Medications:       Home Medications  Were reviewed and are listed in nursing record. and/or listed below  Prior to Admission medications    Medication Sig Start Date End Date Taking?  Authorizing Provider   spironolactone (ALDACTONE) 25 MG tablet TAKE 1 TABLET BY MOUTH EVERY DAY 21   Seema Zavala APRN - CNP   torsemide (DEMADEX) 20 MG tablet Take 1 tablet by mouth daily 3/11/21   [place] Press, APRN - CNP   carvedilol (COREG) 25 MG tablet TAKE 1 TABLET BY MOUTH TWICE A DAY WITH MEALS 3/10/21   Kait Press, APRN - CNP   isosorbide mononitrate (IMDUR) 30 MG extended release tablet TAKE 1 TABLET BY MOUTH EVERY DAY 3/10/21   [place] Press, APRN - CNP   hydrALAZINE (APRESOLINE) 50 MG tablet TAKE 1 TABLET BY MOUTH TWICE A DAY 2/8/21   Kait Press, APRN - CNP   Aspirin-Acetaminophen-Caffeine (EXCEDRIN EXTRA STRENGTH PO) Take by mouth    Historical Provider, MD   Blood Pressure Monitoring [place]) [address] 1 each by Does not apply route daily 2/27/20   [place] Press, APRN - CNP          Inpatient Medications:   piperacillin-tazobactam  3,375 mg IntraVENous Q8H    vancomycin (VANCOCIN) intermittent dosing (placeholder)   Other See Admin Instructions    sodium chloride flush  5-40 mL IntraVENous 2 times per day    famotidine (PEPCID) injection  20 mg IntraVENous Daily    aspirin  81 mg Oral Daily    clopidogrel  75 mg Oral Daily    atorvastatin  80 mg Oral Nightly       IV drips:   dextrose      sodium chloride      norepinephrine 20 mcg/min (10/04/21 0736)       PRN:  dextrose, dextrose, sodium chloride flush, sodium chloride, ondansetron **OR** ondansetron, polyethylene glycol, acetaminophen **OR** acetaminophen, perflutren lipid microspheres    Allergy:     Cefuroxime axetil       Review of Systems:     Unable to perform ROS since patient is comatose. Physical Examination:     Vitals:    10/04/21 0715 10/04/21 0730 10/04/21 0745 10/04/21 0800   BP:       Pulse: 86 85 83 82   Resp: 20 19 20 20   Temp:   98.2 °F (36.8 °C)    TempSrc:   Axillary    SpO2: 96% 97% 96% 96%   Weight:       Height:           Wt Readings from Last 3 Encounters:   10/04/21 (!) 375 lb (170.1 kg)   09/21/20 (!) 375 lb (170.1 kg)   06/08/20 (!) 389 lb (176.4 kg)       Objective:  General Appearance:  Ill-appearing.     Vital signs: (most recent): Blood pressure 93/70, pulse 82, temperature 98.2 °F (36.8 °C), temperature source Axillary, resp. rate 20, height 5' 5\" (1.651 m), weight (!) 375 lb (170.1 kg), SpO2 96 %, not currently breastfeeding.  (BP 98/74). Output: Producing urine and no stool output. HEENT: Normal HEENT exam.    Lungs:  (Patient intubated. RR 20.)  Heart: Normal rate. Regular rhythm. Positive for gallop. Chest: Symmetric chest wall expansion. Abdomen: Abdomen is soft and non-distended. (Unable to assess since patient is unconscious. ). Extremities: (Patient comatose.)  Pulses: Distal pulses are intact. Neurological: GCS score is 3. Pupils:  (Fixed, dilated pupils. ). Skin:  Warm and dry. Labs:     Recent Labs     10/03/21  1802 10/04/21  0048    133*   K 6.5* 4.1   BUN 20 24*   CREATININE 2.3* 2.8*   CL 99 100   CO2 17* 20*   GLUCOSE 175* 129*   CALCIUM 7.7* 8.5   MG 2.50* 2.10     Recent Labs     10/03/21  1802 10/03/21  1802 10/04/21  0645   WBC 21.4*  --  18.3*   HGB 12.8  --  14.9   HCT 43.1  --  47.1     --  199   .8*   < > 95.0    < > = values in this interval not displayed. No results for input(s): CHOLTOT, TRIG, HDL in the last 72 hours. Invalid input(s): LIPIDCOMM, CHOLHDL, VLDCHOL, LDL  Recent Labs     10/03/21  1802 10/04/21  0645   INR 1.66* 2.24*     Recent Labs     10/03/21  1802 10/04/21  0048 10/04/21  0647   TROPONINI 0.02* 0.22* 0.27*     No results for input(s): BNP in the last 72 hours. No results for input(s): TSH in the last 72 hours. No results for input(s): CHOL, HDL, LDLCALC, TRIG in the last 72 hours.]    Lab Results   Component Value Date    CKTOTAL 160 01/23/2019    TROPONINI 0.27 (H) 10/04/2021         Imaging:     I personally reviewed imaging studies including CXR, Stress test, TTE/JESSENIA.     Last ECG (if available) -personally interpreted EKG:  I have reviewed EKG with the following interpretation  NSR, likely biatrial enlargement with right axis deviation; ST-T changes consistent with pulmonary pattern; anterolateral ischemia; prolonged QRS. Telemetry: Personally interpreted  NSR    Last Stress (3/6/20): marked LV enlargement; mild reduction in LV systolic function, EF 56%. Last TTE/JESSENIA (6/21/21): normal cavity size & wall thickness; severely decreased systolic function, EF 32-20%; diffuse hypokinesia with regional variations. Last Cath (6/24/21): right heart cath showing systolic heart failure, volume overload, elevated systemic vascular resistance and a low output state. Last CMR: cardiac event monitor placed on 9/15/21. No interrogation record available. Assessment / Plan:   Ms. Lexie Bolton is a 54year old female with a history of nonischemic cardiomyopathy and CHF who presented to the ED with cardiac arrest. She had been worked up for her CHF at [facility] and was thought to be noncompliant with her medications. Successive CPR attempts with epinephrine was successful in establishing ROSC; however head CT shows diffuse cerebral edema and severe global hypoxic ischemic injury, consistent with brain death. Cardiac arrest  Currently patient with telemetry readings showing NSR; EKG shows right axis deviation, ST-T changes consistent with pulmonary pattern, prolonged QRS. Troponins 0.02->0.22->0.27  - Review cardiac monitor (was wearing one when she arrested)  - ASA 81mg  - Crestor  - Lipitor  - Trend troponins    Acute on chronic combined systolic and diastolic heart failure  Pro-BNP 25,954.  - Repeat TTE  - Interrogate heart monitor  - Plan as above per cardiac arrest    Tobacco use was discussed with the patient and educated on the negative effects. I have asked the patient to not utilize these agents. Thank you for allowing to us to participate in the care or Vena DIAN Bolton. Further evaluation will be based upon the patient's clinical course and testing results.     I have spent 40 minutes of face to face time with the patient with more than 50% spent counseling and coordinating care. All questions and concerns were addressed to the patient/family. Alternatives to my treatment were discussed. The note was completed using EMR. Every effort was made to ensure accuracy; however, inadvertent computerized transcription errors may be present. I have personally reviewed the reports and images of labs, radiological studies, cardiac studies including ECG's and telemetry, current and old medical records. Discussed with Hugo Mcneal MD, [facility] [place], [address]    Signed: Heidy Bobo MD  PGY-1

## 2021-10-04 NOTE — ED NOTES
Pt presents to the ed via ems with c/o cardiac arrest. Per family at the scene reports pt last seen well at 1600. At 1700 pt found down by family without pulse. CPR initiated by ems once on the scene as pt was pulseless. Upon arrival CPR in progress, Igel in place. Pt cool to touch.       Arlene Bishop RN  10/03/21 3323

## 2021-10-05 NOTE — PROGRESS NOTES
Brain Death Examination  Physicians Completing Exam:  Physician name: Tiff Venegas MD  Physician clinical service: Pulmonary and Critical Care   Diagnosis: Brain death  Probable cause of coma: s/p cardiac arrest     1. Vital Statistics  A. Systolic blood pressure greater than or equal to 100 mmHg? yes  B. Body temperature greater than or equal to 36.5deg Celsius? yes  C. Toxicology results? yes  D. Pentobarbital level? no  E. Mydriatic drugs used? no  F. Neuromuscular blocking agents used? no  G. Any residual neuromuscular blockade? no    2. Cerebral responsivity  A. Response to central painful stimulation at any three SUPRACLAVICULAR locations (i.e. supra-orbital pain, TMJ pressure, nares reflex, anterior axillary fold pressure, upper trapezius squeeze)? no    3. Brainstem responsivity  A. Pupillary reaction present? no  B. Pupil size (constricted, mid-size, dilated)  1. Right pupil: dilated non reactive   2. Left pupil: dilated non reactive   C. Corneal reflexes present? no  D. Oculocephalic reflex present? no  E. Oculovestibular reflex present? no  F. Gag reflex present? no  G. Cough reflex present? no  H. Spontaneous breathing present? no  4. Apnea test  ABG at 7:19pm 7.344/42. 1/86  ABG at 7:24pm 7.219/61.6/53.2  There was no respiratory effort   Apnea test was aborted after 5 minutes due to severe hypotension in spite of pressors support  PCO2 increase in 5 minutes is rounded to 20.      5. Results of ancillary tests if performed (document time of study result): no    Date/Time of Death: 10/05/2021 at 7:29pm    Tiff Venegas MD

## 2021-10-05 NOTE — PROGRESS NOTES
Clinical Pharmacy Progress Note    IV Vancomycin - Management by Pharmacy    Consult Date(s): 10/4/21  Consulting Provider(s): Dr. Neff Handsome / Plan    Indication: Aspiration PNA - Vancomycin   Concurrent Antimicrobials: Zosyn - day #2   Day of Vanc Therapy: #3   Current Dosing Method: Intermittent   Therapeutic Goal: 15 - 20 mg/L   Current Dose / Frequency: Intermittent   Plan / Rationale:   o Per night shift RN, patient received vancomycin 2g IV x 1 in the ED, but it wasn't charted on. Unclear what time this may have been given.  o Level collected 10/05/21 and within goal.   o Given current clinical status and decline in renal function, will obtain a random vancomycin level tomorrow AM and re-dose if vancomycin therapy is still desired  o Will continue to monitor clinical condition and make adjustments to regimen as appropriate. Thank you for consulting Pharmacy! Lui Lee, PharmD Candidate 2022  10/5/2021 1:03 PM      Interval Update: Patient has severe anoxic brain injury. Patient will undergo brain death exam today. Subjective/Objective: Ms. Matheus Cantu is a 54 y.o. female with a PMHx significant for CHF,HTN, admitted for possible sepsis. Pharmacy has been consulted to dose vancomycin. Height:   Ht Readings from Last 1 Encounters:   10/04/21 5' 5\" (1.651 m)     Weight:   Wt Readings from Last 1 Encounters:   10/05/21 (!) 390 lb 3.4 oz (177 kg)       Level(s) / Doses:    Date Time Dose Level / Type of Level Interpretation   10/5 0325 2g IV x 1 (10/3)  Random level 19.4  Will hold dose today          Note: Serum levels collected for AUC-based dosing may be high if collected in close proximity to the dose administered. This is not necessarily an indicator of toxicity.     Cultures & Sensitivities:    Date Site Micro Susceptibility / Result   10/3/21 Blood x2 No growth to date    10/3/21 Urine No growth at 18 to 36 hours    10/4/21 C. diff Negative    10/4/21 COVID-19, rapid Negative Labs / Ancillary Data:    Estimated Creatinine Clearance: 23 mL/min (A) (based on SCr of 4.9 mg/dL (H)). (Adjusted BW)    Recent Labs     10/03/21  1802 10/03/21  1802 10/04/21  0048 10/04/21  0645 10/04/21  1810 10/05/21  0325   CREATININE 2.3*   < > 2.8*  --  3.9* 4.9*   BUN 20   < > 24*  --  35* 42*   WBC 21.4*  --   --  18.3*  --  12.2*    < > = values in this interval not displayed.       10/04/21  0048   Procalcitonin 14.6

## 2021-10-05 NOTE — PROGRESS NOTES
ICU Progress Note    Admit Date: 10/3/2021  Day:3  Vent Day: 3  IV Access: Peripheral, left femoral vein central line and right femoral artery art line  IV Fluids:None  Vasopressors: Levo and Vaso                Antibiotics: Zosyn and Vanc  Diet: Diet NPO    CC: Cardiac Arrest    Interval history:  NAEO  Increasing pressor requirements: added Vaso to norepi  Pt still not responsive to pain  On Zosyn and Vanc  Family meeting yesterday  Family may decide to change code status later today    HPI:  Emy Rios is a 54 y.o. female with a PMHx of CHF,HTN comes in to the ED for cardiac arrest. Pt came in via EMS after having a cardiac arrest approx 20 min before anyone started CPR. EMS did compressions for approx 30 min until he arrived to the ED. Pt was last seen normal at 4pm and was found down at 5pm. Pt initially had asystole and was given 5 rounds of epi. Pt obtained ROSC on her way to the ED but then went back into PEA on arrival. After 1 round of CPR a pulse was found. Pt was severely hypotensive. Central line was placed and a ET tube. Pt had Epi and levo running through the central. No STEMI was seen on post CPR EKG. Pt then had another cardiac arrest when she became bradycardic. Bicarb, calcium, and epi was given and patient obtained ROSC.      Medications:     Scheduled Meds:   piperacillin-tazobactam  3,375 mg IntraVENous Q8H    vancomycin (VANCOCIN) intermittent dosing (placeholder)   Other See Admin Instructions    sodium chloride flush  5-40 mL IntraVENous 2 times per day    famotidine (PEPCID) injection  20 mg IntraVENous Daily    aspirin  81 mg Oral Daily    clopidogrel  75 mg Oral Daily    [Held by provider] atorvastatin  80 mg Oral Nightly     Continuous Infusions:   vasopressin (Septic Shock) infusion 0.04 Units/min (10/05/21 0809)    dextrose      sodium chloride      norepinephrine 19 mcg/min (10/05/21 0809)     PRN Meds:dextrose, dextrose, sodium chloride flush, sodium chloride, ondansetron **OR** ondansetron, polyethylene glycol, acetaminophen **OR** acetaminophen, perflutren lipid microspheres    Objective:   Vitals:   T-max:  Patient Vitals for the past 8 hrs:   BP Temp Temp src Pulse Resp SpO2 Weight   10/05/21 0825 -- -- -- -- -- 92 % --   10/05/21 0800 (!) 114/46 99.7 °F (37.6 °C) Bladder 86 20 93 % --   10/05/21 0645 -- -- -- 85 20 92 % --   10/05/21 0630 -- -- -- 85 20 92 % --   10/05/21 0615 -- -- -- 85 20 93 % --   10/05/21 0600 -- -- -- 86 20 93 % --   10/05/21 0545 -- -- -- 85 20 93 % --   10/05/21 0530 -- -- -- 85 20 93 % --   10/05/21 0515 -- -- -- 85 20 93 % --   10/05/21 0501 -- -- -- 85 20 96 % --   10/05/21 0500 -- -- -- 85 16 93 % --   10/05/21 0445 -- -- -- 89 28 93 % --   10/05/21 0430 -- -- -- 88 19 94 % --   10/05/21 0415 -- -- -- 87 20 95 % --   10/05/21 0400 (!) 86/59 100 °F (37.8 °C) Bladder 87 20 97 % (!) 390 lb 3.4 oz (177 kg)   10/05/21 0345 -- -- -- 88 20 98 % --   10/05/21 0330 -- -- -- 88 20 97 % --   10/05/21 0315 -- -- -- 88 20 96 % --   10/05/21 0300 87/62 -- -- 88 20 94 % --   10/05/21 0245 -- -- -- 88 20 95 % --   10/05/21 0230 -- -- -- 89 20 94 % --   10/05/21 0215 -- -- -- 88 20 99 % --   10/05/21 0200 93/68 100.8 °F (38.2 °C) Bladder 88 20 99 % --   10/05/21 0145 -- -- -- 87 20 99 % --   10/05/21 0130 -- -- -- 87 20 99 % --   10/05/21 0115 -- -- -- 88 20 99 % --   10/05/21 0105 -- -- -- 88 20 98 % --   10/05/21 0100 94/66 -- -- 89 20 97 % --   10/05/21 0045 -- -- -- 86 20 98 % --       Intake/Output Summary (Last 24 hours) at 10/5/2021 0834  Last data filed at 10/5/2021 0600  Gross per 24 hour   Intake 630.03 ml   Output 830 ml   Net -199.97 ml       Review of Systems   Unable to perform ROS: Intubated       Physical Exam  Constitutional:       Appearance: She is obese. She is ill-appearing. HENT:      Head: Normocephalic.       Right Ear: External ear normal.      Left Ear: External ear normal.      Nose: Nose normal.   Eyes:      Comments: Pupils are dilated with no observed constriction to light   Cardiovascular:      Rate and Rhythm: Normal rate and regular rhythm. Pulses: Normal pulses. Heart sounds: No murmur heard. No friction rub. No gallop. Pulmonary:      Breath sounds: Wheezing present. Comments: Expiratory wheezing present on ventilator  Abdominal:      Palpations: Abdomen is soft. There is no mass. Tenderness: There is no abdominal tenderness. There is no guarding. Musculoskeletal:      Right lower leg: Edema present. Left lower leg: Edema present. Comments: 1+ bilateral pitting edema in LE. Difficult to assess in SCD  Skin:     General: Skin is warm. Coloration: Skin is not pale. Findings: No bruising or lesion. Neurological:      Comments: Patient is intubated without sedation. Not responding to pain or vocal cues. Psychiatric:      Comments: Unable to assess     LABS:    CBC:   Recent Labs     10/03/21  1802 10/04/21  0645 10/05/21  0325   WBC 21.4* 18.3* 12.2*   HGB 12.8 14.9 14.0   HCT 43.1 47.1 44.8    199 162   .8* 95.0 95.8     Renal:    Recent Labs     10/03/21  1802 10/03/21  1802 10/04/21  0048 10/04/21  1810 10/05/21  0325      < > 133* 136 136   K 6.5*  --  4.1 4.6 4.4   CL 99   < > 100 101 99   CO2 17*   < > 20* 20* 20*   BUN 20   < > 24* 35* 42*   CREATININE 2.3*   < > 2.8* 3.9* 4.9*   GLUCOSE 175*   < > 129* 95 87   CALCIUM 7.7*   < > 8.5 7.6* 7.6*   MG 2.50*  --  2.10  --  1.90   PHOS  --   --  5.0*  --   --    ANIONGAP 20*   < > 13 15 17*    < > = values in this interval not displayed. Hepatic:   Recent Labs     10/03/21  1802   AST 83*   ALT 54*   BILITOT 0.8   BILIDIR 0.4*   PROT 6.3*   LABALBU 2.9*   ALKPHOS 87     Troponin:   Recent Labs     10/03/21  1802 10/04/21  0048 10/04/21  0647   TROPONINI 0.02* 0.22* 0.27*     BNP: No results for input(s): BNP in the last 72 hours. Lipids: No results for input(s): CHOL, HDL in the last 72 hours.     Invalid input(s): LDLCALCU, TRIGLYCERIDE  ABGs:    Recent Labs     10/04/21  0646 10/04/21  1808 10/05/21  0325   PHART 7.258* 7.307* 7.284*   GZT0NCM 51.0* 41.8 45.0   PO2ART 97.1 80.7 116.0*   WRY8AMC 23 20.9* 21   BEART -4.9* -5* -5.4*   Y6UCQYZD 97 95 99   IVV4JPZ 24 22 23       INR:   Recent Labs     10/03/21  1802 10/04/21  0645 10/05/21  0325   INR 1.66* 2.24* 3.19*     Lactate:   Recent Labs     10/04/21  1808   LACTATE 3.29*     Cultures:  -----------------------------------------------------------------  RAD:   XR CHEST PORTABLE   Final Result      Stable cardiomegaly and pulmonary edema. CT ABDOMEN PELVIS W IV CONTRAST Additional Contrast? None   Final Result      1. No evidence of pulmonary embolism. 2.  Bilateral perihilar and peripheral areas of consolidation representing a combination of pulmonary edema, atelectasis, and possible aspiration. 3.  Bilateral 3-6th anterior rib fractures and nondisplaced sternal body fracture. 4.  Cardiomegaly with right atrioventricular dilatation. 5.  No acute abnormality in the abdomen and pelvis. CTA PULMONARY W CONTRAST   Final Result      1. No evidence of pulmonary embolism. 2.  Bilateral perihilar and peripheral areas of consolidation representing a combination of pulmonary edema, atelectasis, and possible aspiration. 3.  Bilateral 3-6th anterior rib fractures and nondisplaced sternal body fracture. 4.  Cardiomegaly with right atrioventricular dilatation. 5.  No acute abnormality in the abdomen and pelvis. CT Head WO Contrast   Final Result      1. Diffuse cerebral edema and severe global hypoxic ischemic injury. Discussed with the patient's nurse Luz. XR CHEST PORTABLE   Final Result      Stable cardiomegaly. Right perihilar opacities may represent pulmonary edema.                Assessment/Plan:   Emy Rios is a 54 y.o. female with a PMHx of CHF,HTN comes in to the ED for cardiac arrest.     Cardiac arrest 2/2 nonischemic HFrEF  (20% recovered to 55% then dropped to 35%)  Pt was found down for an unknown amount of time and was given CPR for 30 min with x5 epi. Pt achieved ROSC and then had another cardiac arrest from bradycardia. Epi, bicarb and calcium was given and achieved ROSC after pulse check. Prolonged resuscitation. Pt has a significant history of CHF. PRO BNP- [id number]. As per chart review- questionable medication compliance. Previous admission for SOB 2/2 acute CHF on 6/24/2021. Grade 2 diastolic dysfunction. Patient shown to have bilateral 3-6th anterior rib fractures and nondisplaced sternal body fracture likely 2/2 chest compressions. Father believes stress and insomnia in the preceding days may have contributed. Prior cardiac studies:  6/21/2021 TTE  - Left ventricle: The cavity size is normal. Wall thickness is normal. Systolic function was severely reduced. The estimated ejection fraction was in the range of 20% to 25%. Diffuse hypokinesis with regional variation     3/10/2020 Nuclear stress    Marked LV enlargement    Mild reduction in LV systolic function with EF 50%    There is normal isotope uptake at stress and rest.     6/24/2021 RHC  IMPRESSIONS: Systolic heart failure, volume overload, elevated  systemic vascular resistance; low output state. 10/4/2021 Echo  Overall left ventricular systolic function appears normal with an ejection   fraction of 60-65%. There is septal flattening consistent with right ventricular pressure and   volume overload. Indeterminate diastolic function. Moderate tricuspid regurgitation. Estimated pulmonary artery systolic pressure is 62 mmHg assuming a right   atrial pressure of 15mmHg. The right ventricle is enlarged. The right atrium is dilated. Inferior vena cava appears dilated .     - Cardiology consulted, appreciate recs  -NYHA Class: II             -Last troponin was 0.27  -Aspirin 81mg  -Plavix  - Assess cardiac pacemaker to see the root cause of the Asystole  - Holding Lipitor 80 mg   -Fluid restriction - currently NPO  -CT head showed findings consistent with anoxic brain injury. CTA showed pulmonary edema, atelectasis, and possible aspiration  -No hypothermic protocol      Diffuse cerebral edema and severe global hypoxic ischemic injury   Patient went into cardiac arrest twice, was found down for a prolonged period of time, and resuscitation was prolonged as well. MRI findings suggestive of anoxic brain injury and resulting edema.  - Neurology consulted, appreciate recs  - Will reach out to family today regarding prognosis and code status  - Palliative care consulted     Sepsis  Septic Shock, possibly 2/2 aspiration pneumonia  Elevated Cr, transaminase with severe hypotension and elevated WBC count and lactate. Radiology findings concerning for possible aspiration pneumonia (vs. pulm edema). Tested + for COVID two weeks ago. -Vanc and cefepime started in ED, transitioned to vanc and Zosyn  -Levo ggt, Vaso gtt  -CTA- Bilateral perihilar and peripheral areas of consolidation representing a combination of pulmonary edema, atelectasis, and possible aspiration.      Anion Gap Met Acidosis   AG of 20; bicarb 17,CO2 >100 on VBG.    -Dextrose 5 fluids given  -Na bicarb given during CPR  -Pt intubated 10/3     Hyperkalemia   Pt admitted with K 6.5. Repeat K with POC.   -Daily RFP  -Calcium carbonate   -Insulin/D5 to drive K back into the cells      ANJELICA   Renal failure lactic acidosis   Cr on 2.3 baseline 1.6.  Hx of UTI with ecoli.   -avoid nephrotoxic agents  -Urine studies sent out for etiology      HTN  -Hold bp medications as they are soft since cardiac arrest     Obesity BMI >60  - Unable to  patient on weight loss due to AMS    Code Status: FULL, may change code status per daughter POA later today  FEN: NPO   PPX: SCD  DISPO: ICU    Gerber Perez MD, PGY-1  10/05/21  8:34 AM    This patient has been staffed and discussed with Dr. Samanta Wilson    Patient was seen, examined and discussed with Dr. Blayne Winchester. I agree with the interval history. My physical exam confirms the findings listed below  Chart was reviewed including labs and medical records confirm the findings noted    S/p cardiac arrest.  Initial rhythm was asystole. Had another cardiac arrest in ED  Acute respiratory failure on mechanical vent support. , RR 20, FiO2   65%, PEEP 5  ABG 7.28/45/226. Known non ischemic cardiomyopathy with EF of 25%  Anoxic brain injury: CT scan already showing changes of diffuse edema   Cardiogenic shock / septic shock: on levo at 22 and vaso at 0.04  Aspiration pneumonia. On vanc and zosyn   ANJELICA: creatinine is up to 4.9     Clinically there is no neuroglial reflexes  Plan for brain death exam today. If not conclusive (as she is on two pressors) will get nuclear brain perfusion scan     Pt has a high probability of imminent or life-threatening deterioration requiring close monitoring, and highly complex decision-making and/or interventions of high intensity to assess, manipulate, and support his critical organ systems to prevent a likely inevitable decline which could occur if left untreated.      A total critical care time 45 minutes was used. This includes but not limited to examining patient, collaborating with other physicians, monitoring vital signs, telemetry, continuous pulse oximetry, and clinical response to IV medications, documentation time, review and interpretation of laboratory and radiological data, review of nursing notes and old record review.  This time excludes any time that may have been spent performing procedures for life threatening organ failure

## 2021-10-05 NOTE — DISCHARGE SUMMARY
INTERNAL MEDICINE DEPARTMENT AT [address] [place] [address]  DISCHARGE SUMMARY    Patient ID: Thomson Hamper                                             Discharge Date: 10/5/2021   Patient's PCP: No primary care provider on file. Discharge Physician: Gerber Perez MD MD  Admit Date: 10/3/2021   Admitting Physician: Lorri Villanueva MD    PROBLEMS DURING HOSPITALIZATION:  Patient Active Problem List   Diagnosis    Hypertension, essential    ANJELICA (acute kidney injury) ([facility] [address]    Chest pain    Cardiomyopathy due to hypertension ([facility] [address]    Chronic combined systolic and diastolic heart failure (HCC)    Swelling of right lower extremity    BMI 60.0-69.9, adult ([facility] [address]    Myocardiopathy ([facility] [address]    Renal insufficiency    Non-compliance    Morbid obesity with body mass index (BMI) of 60.0 to 69.9 in adult ([facility] [address]    Hypersomnia    Obstructive sleep apnea (adult) (pediatric)    Cardiac arrest ([facility] [address]    Anoxic brain injury ([facility] [address]       DISCHARGE DIAGNOSES:  1- Cardiac arrest 2/2 nonischemic HFrEF  2- Diffuse cerebral edema and severe global hypoxic ischemic injury  3- Sepsis  4- Septic shock likely 2/2 aspiration pneumonia  5- Anion gap metabolic acidosis  6- Hyperkalemia  7- ANJELICA  8- Renal failure lactic acidosis  9- HTN  10- Obesity with BMI >60    Hospital Course:     Emy Rios is a 54 y.o. female with a PMHx of CHF,HTN comes in to the ED for cardiac arrest. Pt came in via EMS after having a cardiac arrest approx 20 min before anyone started CPR. EMS did compressions for approx 30 min until he arrived to the ED. Pt was last seen normal at 4pm and was found down at 5pm. Pt initially had asystole and was given 5 rounds of epi. Pt obtained ROSC on her way to the ED but then went back into PEA on arrival. After 1 round of CPR a pulse was found. Pt was severely hypotensive. Central line was placed and a ET tube. Pt had Epi and levo running through the central. No STEMI was seen on post CPR EKG. Pt then had another cardiac arrest when she became bradycardic. Bicarb, calcium, and epi was given and patient obtained ROSC. In the hospital, patient was found to be in septic shock requiring increasing pressor requirements, adding Vaso to norepi. On Zosyn and vanc for likely aspiration pneumonia. She required intubation throughout her admission. She did not breath over the vent. Neurology was consulted, and brain imaging showed diffuse cerebral edema and severe global hypoxic ischemic injury. Patient's initial cardiac arrest was likely contributed to by CHF. Patient went into renal failure likely 2/2 end organ damage from hypoxia 2/2 cardiac arrests and prolonged resuscitation. Based on brain scans and neurological deficits it was believed that the patient was likely brain dead and would not recover. Palliative reached out to family and held a family meeting to discuss code status and care goals. The daughter Pj Leavitt who was POA decided to allow the medical team to perform a brain death exam. The exam showed her to be brain dead and time of death was declared at 19:29 on 10/5. The patient was then extubated. Physical Exam:  BP (!) 86/55   Pulse 86   Temp 99.9 °F (37.7 °C) (Bladder)   Resp 22   Ht 5' 5\" (1.651 m)   Wt (!) 390 lb 3.4 oz (177 kg)   LMP  (LMP Unknown)   SpO2 94%   BMI 64.94 kg/m²   Constitutional:       Appearance: She is obese. She is ill-appearing. HENT:      Head: Normocephalic. Right Ear: External ear normal.      Left Ear: External ear normal.      Nose: Nose normal.   Eyes:      Comments: Pupils are dilated with no observed constriction to light   Cardiovascular:      Rate and Rhythm: Normal rate and regular rhythm. Pulses: Normal pulses. Heart sounds: No murmur heard. No friction rub. No gallop. Pulmonary:      Breath sounds: Wheezing present. Comments: Expiratory wheezing present on ventilator  Abdominal:      Palpations: Abdomen is soft.  There is no mass.      Tenderness: There is no abdominal tenderness. There is no guarding. Musculoskeletal:      Right lower leg: Edema present. Left lower leg: Edema present. Comments: 2+ bilateral pitting edema in LE. Skin:     General: Skin is warm. Coloration: Skin is not pale. Findings: No bruising or lesion. Neurological:      Comments: Patient is intubated without sedation. Not responding to pain or vocal cues.     Psychiatric:      Comments: Unable to assess     Consults: neurology  Significant Diagnostic Studies:  CT scan head  Treatments: Pressors, ventilator, antibiotics   at time of discharge  Discharged Condition:   Follow Up: None    DISCHARGE MEDICATION:     Medication List      ASK your doctor about these medications    carvedilol 25 MG tablet  Commonly known as: COREG  TAKE 1 TABLET BY MOUTH TWICE A DAY WITH MEALS     EXCEDRIN EXTRA STRENGTH PO     hydrALAZINE 50 MG tablet  Commonly known as: APRESOLINE  TAKE 1 TABLET BY MOUTH TWICE A DAY     isosorbide mononitrate 30 MG extended release tablet  Commonly known as: IMDUR  TAKE 1 TABLET BY MOUTH EVERY DAY     Sphygmomanometer Misc  1 each by Does not apply route daily     spironolactone 25 MG tablet  Commonly known as: ALDACTONE  TAKE 1 TABLET BY MOUTH EVERY DAY     torsemide 20 MG tablet  Commonly known as: DEMADEX  Take 1 tablet by mouth daily            Time Spent on discharge is more than 30 minutes    Signed:  Edwin Ram MD,  MD   10/5/2021

## 2021-10-05 NOTE — PLAN OF CARE
Problem: OXYGENATION/RESPIRATORY FUNCTION  Goal: Patient will maintain patent airway  10/5/2021 0848 by Peyton Manzano RN  Outcome: Ongoing    Problem: OXYGENATION/RESPIRATORY FUNCTION  Goal: Patient will achieve/maintain normal respiratory rate/effort  Description: Respiratory rate and effort will be within normal limits for the patient  10/5/2021 0848 by Peyton Manzano RN  Outcome: Ongoing    Problem: HEMODYNAMIC STATUS  Goal: Patient has stable vital signs and fluid balance  10/5/2021 0848 by Peyton Manzano RN  Outcome: Ongoing    Problem: FLUID AND ELECTROLYTE IMBALANCE  Goal: Fluid and electrolyte balance are achieved/maintained  10/5/2021 0848 by Peyton Manzano RN  Outcome: Ongoing    Problem: Skin Integrity:  Goal: Absence of new skin breakdown  Description: Absence of new skin breakdown  10/5/2021 0848 by Peyton Manzano RN  Outcome: Ongoing  Note: Absence of new skin breakdown. Patient turned routinely with pillow support. Heels and arms elevated off of bed. Skin assessed. Prophylactic sacral heart in place. Patient on special air mattress. Patient educated on risks associated with prolonged bed rest. Patient checked routinely for incontinence, cleansed thoroughly in its presence. Lines off loaded from skin. Will continue to monitor and reassess. Problem: Falls - Risk of:  Goal: Will remain free from falls  Description: Will remain free from falls  10/5/2021 0848 by Peyton Manzano RN  Outcome: Ongoing  Note: Patient will remain free of falls throughout the duration of the shift. Bed locked in lowest position. Non skid socks on. Bed and chair alarm activated. Call light and belongings within reach. Patient calls out approprietly. Room door open at all times. Patient rounded on frequently. 3/4 side rails up. Will continue to monitor.        Problem: Grieving:  Goal: Able to identify stages of grief  Description: Able to identify stages of grief  Outcome: Ongoing     Problem: Grieving:  Goal: Verbalizes feelings, concerns and thoughts  Description: Verbalizes feelings, concerns and thoughts  Outcome: Ongoing  Note: Family updated.       Problem: Pressure Ulcer:  Goal: Absence of pressure ulcer  Description: Absence of pressure ulcer  Outcome: Ongoing

## 2021-10-05 NOTE — PROGRESS NOTES
Patient is intubated and unresponsive. Pupils are fixed and dilated, no corneal reflex bilaterally. No cough or gag reflex noted. No response to noxious stimuli. They are not initiating any breaths over the ventilator. NSR on telemetry. Remains on vasopressin and levophed. Arterial line in place. Tolerating current vent settings. Rectal tube in place with watery output. Indwelling urinary catheter in place. Generalized edema noted. CVC in left fem. Temp of 99.7F.

## 2021-10-05 NOTE — PROGRESS NOTES
Palliative Care Chart Review  and Check in Note:     NAME:  Emy Jacobson Date: 10/3/2021  Hospital Day:  Hospital Day: 3   Current Code status: Full Code    Palliative care is continuing to following Ms. Paul Taylor for symptom management,  and goals of care discussion as needed. Patient's chart reviewed today 10/5/21. Saw pt at the bedside, d/w ISMA Bonilla and Dr. Tracey Rose. Spoke with pt's daughter [facility] over the phone. She reports that family has not gotten together yet to discuss the pt's code status, however she is strongly considering change to [facility]. She understands pt will remain a full code until she states otherwise. She understands that pt may be able to undergo brain death exam today and is open to this. She does not want to be present for exam, but wants to be present afterwards. She and pt's mother Wolfgang Renee will be at bedside today after 4pm. She understands that if pt is pronounced brain dead then life support would need to be removed. DIAN/w ISMA Bonilla, Dr. Tracey Rose, Dr. Logan, Mary Lou VILLAFUERTE. Received approval from RN Manager [address] [place] [address] for pt's adult children, [place] [id number] and [facility] and parents Wolfgang Renee and Mal Schmid to come to bedside. They understand that no more than two should be present in the hospital at any given time. Addendum: Pt's mother Wolfgang Renee called with questions about brain death exam. Explained that pt's daughter [facility] requested information about brain death exam this morning and was agreement with performing exam today if possible. Explained that appropriateness of brain death exam is ultimately up to the physicians. She has not spoken with [facility] yet today. Attempted to conference call [facility] however she did not . Encouraged pt's mother Wolfgang Renee to discuss with [facility] today. The following are the currently established goals/code status, and Symptom management. Goals of care: Continue with current management.  Pt's daughter [facility] open to brain death exam if medically appropriate today. Discussed as above.      Code status: Full Code    Discharge plan: NOY Shelley - CNP  10/05/21  9:08 AM

## 2021-10-05 NOTE — PLAN OF CARE
Problem: OXYGENATION/RESPIRATORY FUNCTION  Goal: Patient will maintain patent airway  10/4/2021 2044 by Raciel Argueta RN  Outcome: Ongoing     Problem: OXYGENATION/RESPIRATORY FUNCTION  Goal: Patient will achieve/maintain normal respiratory rate/effort  Description: Respiratory rate and effort will be within normal limits for the patient  10/4/2021 2044 by Raciel Argueta RN  Outcome: Ongoing     Problem: HEMODYNAMIC STATUS  Goal: Patient has stable vital signs and fluid balance  10/4/2021 2044 by Raciel Argueta RN  Outcome: Ongoing     Problem: FLUID AND ELECTROLYTE IMBALANCE  Goal: Fluid and electrolyte balance are achieved/maintained  10/4/2021 2044 by Raciel Argueta RN  Outcome: Ongoing     Problem: ACTIVITY INTOLERANCE/IMPAIRED MOBILITY  Goal: Mobility/activity is maintained at optimum level for patient  10/4/2021 2044 by Raciel Argueta RN  Outcome: Ongoing     Problem: Skin Integrity:  Goal: Will show no infection signs and symptoms  Description: Will show no infection signs and symptoms  Outcome: Ongoing     Problem: Skin Integrity:  Goal: Absence of new skin breakdown  Description: Absence of new skin breakdown  Outcome: Ongoing     Problem: Falls - Risk of:  Goal: Will remain free from falls  Description: Will remain free from falls  Outcome: Ongoing     Problem: Falls - Risk of:  Goal: Absence of physical injury  Description: Absence of physical injury  Outcome: Ongoing

## 2021-10-05 NOTE — CONSULTS
ICU Consult Note    Admit Date: 10/3/2021  Day: 3  Vent Day: Day 3  IV Access:Peripheral, left femoral vein central line and right femoral artery art line  IV Fluids:None  Vasopressors: Levo ggt. Vaso gtt           Antibiotics: Zosyn, Vanc  Diet: Diet NPO    CC: Cardiac Arrest    Interval history:   NAEO  Pt required addition of vaso to norepi  Vent setting changes overnight: FiO2 80->65%  Patient is not responsive to verbal or pain stimuli this morning. On the vent requiring no sedation. Pupils are dilated and not reactive    HPI:  Emy Rios is a 54 y.o. female with a PMHx of CHF,HTN comes in to the ED for cardiac arrest. Pt came in via EMS after having a cardiac arrest approx 20 min before anyone started CPR. EMS did compressions for approx 30 min until he arrived to the ED. Pt was last seen normal at 4pm and was found down at 5pm. Pt initially had asystole and was given 5 rounds of epi. Pt obtained ROSC on her way to the ED but then went back into PEA on arrival. After 1 round of CPR a pulse was found. Pt was severely hypotensive. Central line was placed and a ET tube. Pt had Epi and levo running through the central. No STEMI was seen on post CPR EKG. Pt then had another cardiac arrest when she became bradycardic. Bicarb, calcium, and epi was given and patient obtained ROSC.      On labs patient was noted have hyperkalemia 6.9,ANJELICA Cr 2.3 ,Mg 2.5, Lactate 8.3, Pro BNP 55517, WBC 21.4 ,     Echo: 6/21/2021: The cavity size is normal. Wall thickness is normal. Systolic function was severely reduced. The estimated ejection fraction was in the range of 20% to 25%. Diffuse hypokinesis with regional variations  CXR-Right perihilar opacities - pulmonary edema with cardiomegaly. CT head- diffuse edema with global hypoxia.     Medications:     Scheduled Meds:   piperacillin-tazobactam  3,375 mg IntraVENous Q8H    vancomycin (VANCOCIN) intermittent dosing (placeholder)   Other See Admin Instructions    sodium chloride flush  5-40 mL IntraVENous 2 times per day    famotidine (PEPCID) injection  20 mg IntraVENous Daily    aspirin  81 mg Oral Daily    clopidogrel  75 mg Oral Daily    [Held by provider] atorvastatin  80 mg Oral Nightly     Continuous Infusions:   vasopressin (Septic Shock) infusion 0.03 Units/min (10/05/21 0600)    dextrose      sodium chloride      norepinephrine 21 mcg/min (10/05/21 0600)     PRN Meds:dextrose, dextrose, sodium chloride flush, sodium chloride, ondansetron **OR** ondansetron, polyethylene glycol, acetaminophen **OR** acetaminophen, perflutren lipid microspheres    Objective:   Vitals:   T-max:  Patient Vitals for the past 8 hrs:   BP Temp Temp src Pulse Resp SpO2 Weight   10/05/21 0645    85 20 92 %    10/05/21 0630    85 20 92 %    10/05/21 0615    85 20 93 %    10/05/21 0600    86 20 93 %    10/05/21 0545    85 20 93 %    10/05/21 0530    85 20 93 %    10/05/21 0515    85 20 93 %    10/05/21 0501    85 20 96 %    10/05/21 0500    85 16 93 %    10/05/21 0445    89 28 93 %    10/05/21 0430    88 19 94 %    10/05/21 0415    87 20 95 %    10/05/21 0400 (!) 86/59 100 °F (37.8 °C) Bladder 87 20 97 % (!) 390 lb 3.4 oz (177 kg)   10/05/21 0345    88 20 98 %    10/05/21 0330    88 20 97 %    10/05/21 0315    88 20 96 %    10/05/21 0300 87/62   88 20 94 %    10/05/21 0245    88 20 95 %    10/05/21 0230    89 20 94 %    10/05/21 0215    88 20 99 %    10/05/21 0200 93/68 100.8 °F (38.2 °C) Bladder 88 20 99 %    10/05/21 0145    87 20 99 %    10/05/21 0130    87 20 99 %    10/05/21 0115    88 20 99 %    10/05/21 0105    88 20 98 %    10/05/21 0100 94/66   89 20 97 %    10/05/21 0045    86 20 98 %    10/05/21 0030    85 20 97 %    10/05/21 0015    85 20 97 %    10/05/21 0000 (!) 70/52 100.4 °F (38 °C) Bladder 85 20 100 %    10/04/21 2345    85 20 97 %    10/04/21 2330    86 20 97 %    10/04/21 2315    86 20 97 %    10/04/21 2300 93/61   86 20 96 %        Intake/Output Summary (Last 24 hours) at 10/5/2021 0656  Last data filed at 10/5/2021 0600  Gross per 24 hour   Intake 630.03 ml   Output 875 ml   Net -244.97 ml       Review of Systems   Reason unable to perform ROS: Intubated. Physical Exam  Constitutional:       Appearance: She is obese. She is ill-appearing. HENT:      Head: Normocephalic. Right Ear: External ear normal.      Left Ear: External ear normal.      Nose: Nose normal.   Eyes:      Comments: Pupils are dilated with no observed constriction to light   Cardiovascular:      Rate and Rhythm: Normal rate and regular rhythm. Pulses: Normal pulses. Heart sounds: No murmur heard. No friction rub. No gallop. Pulmonary:      Breath sounds: Wheezing present. Comments: Expiratory wheezing present on ventilator  Abdominal:      Palpations: Abdomen is soft. There is no mass. Tenderness: There is no abdominal tenderness. There is no guarding. Musculoskeletal:      Right lower leg: Edema present. Left lower leg: Edema present. Comments: 2+ bilateral pitting edema in LE. Skin:     General: Skin is warm. Coloration: Skin is not pale. Findings: No bruising or lesion. Neurological:      Comments: Patient is intubated without sedation. Not responding to pain or vocal cues.     Psychiatric:      Comments: Unable to assess         LABS:    CBC:   Recent Labs     10/03/21  1802 10/04/21  0645 10/05/21  0325   WBC 21.4* 18.3* 12.2*   HGB 12.8 14.9 14.0   HCT 43.1 47.1 44.8    199 162   .8* 95.0 95.8     Renal:    Recent Labs     10/03/21  1802 10/03/21  1802 10/04/21  0048 10/04/21  1810 10/05/21  0325      < > 133* 136 136   K 6.5*  --  4.1 4.6 4.4   CL 99   < > 100 101 99   CO2 17*   < > 20* 20* 20*   BUN 20   < > 24* 35* 42*   CREATININE 2.3*   < > 2.8* 3.9* 4.9*   GLUCOSE 175*   < > 129* 95 87   CALCIUM 7.7* < > 8.5 7.6* 7.6*   MG 2.50*  --  2.10  --  1.90   PHOS  --   --  5.0*  --   --    ANIONGAP 20*   < > 13 15 17*    < > = values in this interval not displayed. Hepatic:   Recent Labs     10/03/21  1802   AST 83*   ALT 54*   BILITOT 0.8   BILIDIR 0.4*   PROT 6.3*   LABALBU 2.9*   ALKPHOS 87     Troponin:   Recent Labs     10/03/21  1802 10/04/21  0048 10/04/21  0647   TROPONINI 0.02* 0.22* 0.27*     BNP: No results for input(s): BNP in the last 72 hours. Lipids: No results for input(s): CHOL, HDL in the last 72 hours. Invalid input(s): LDLCALCU, TRIGLYCERIDE  ABGs:    Recent Labs     10/04/21  0646 10/04/21  1808 10/05/21  0325   PHART 7.258* 7.307* 7.284*   VIW8QVI 51.0* 41.8 45.0   PO2ART 97.1 80.7 116.0*   AWR3ASN 23 20.9* 21   BEART -4.9* -5* -5.4*   J3GLNQMS 97 95 99   YKX6QJT 24 22 23       INR:   Recent Labs     10/03/21  1802 10/04/21  0645 10/05/21  0325   INR 1.66* 2.24* 3.19*     Lactate:   Recent Labs     10/04/21  1808   LACTATE 3.29*     Cultures:  -----------------------------------------------------------------  RAD:   XR CHEST PORTABLE   Final Result      Stable cardiomegaly and pulmonary edema. CT ABDOMEN PELVIS W IV CONTRAST Additional Contrast? None   Final Result      1. No evidence of pulmonary embolism. 2.  Bilateral perihilar and peripheral areas of consolidation representing a combination of pulmonary edema, atelectasis, and possible aspiration. 3.  Bilateral 3-6th anterior rib fractures and nondisplaced sternal body fracture. 4.  Cardiomegaly with right atrioventricular dilatation. 5.  No acute abnormality in the abdomen and pelvis. CTA PULMONARY W CONTRAST   Final Result      1. No evidence of pulmonary embolism. 2.  Bilateral perihilar and peripheral areas of consolidation representing a combination of pulmonary edema, atelectasis, and possible aspiration. 3.  Bilateral 3-6th anterior rib fractures and nondisplaced sternal body fracture.    4. Cardiomegaly with right atrioventricular dilatation. 5.  No acute abnormality in the abdomen and pelvis. CT Head WO Contrast   Final Result      1. Diffuse cerebral edema and severe global hypoxic ischemic injury. Discussed with the patient's nurse Luz. XR CHEST PORTABLE   Final Result      Stable cardiomegaly. Right perihilar opacities may represent pulmonary edema. Assessment/Plan:   Emy Rios is a 54 y.o. female with a PMHx of CHF,HTN comes in to the ED for cardiac arrest.     Cardiac arrest 2/2 nonischemic HFrEF  (20% recovered to 55% then dropped to 35%)  Pt was found down for an unknown amount of time and was given CPR for 30 min with x5 epi. Pt achieved ROSC and then had another cardiac arrest from bradycardia. Epi, bicarb and calcium was given and achieved ROSC after pulse check. Prolonged resuscitation. Pt has a significant history of CHF. PRO BNP- [id number]. As per chart review- questionable medication compliance. Previous admission for SOB 2/2 acute CHF on 6/24/2021. Grade 2 diastolic dysfunction. Patient shown to have bilateral 3-6th anterior rib fractures and nondisplaced sternal body fracture likely 2/2 chest compressions. Prior cardiac studies:  6/21/2021 TTE  - Left ventricle: The cavity size is normal. Wall thickness is normal. Systolic function was severely reduced. The estimated ejection fraction was in the range of 20% to 25%. Diffuse hypokinesis with regional variation     3/10/2020 Nuclear stress    Marked LV enlargement    Mild reduction in LV systolic function with EF 50%    There is normal isotope uptake at stress and rest.     6/24/2021 RHC  IMPRESSIONS: Systolic heart failure, volume overload, elevated  systemic vascular resistance; low output state.   - Cardiology consulted, appreciate recs  -NYHA Class: II             -Repeat Echo   -Trend trops, went up from 0.22 to 0.27  -Aspirin 81mg  -Plavix  - Assess cardiac pacemaker to see the root cause of the Asystole  -Lipitor 80 mg   -Fluid restriction - currently NPO  -CT head showed findings consistent with anoxic brain injury. CTA showed pulmonary edema, atelectasis, and possible aspiration  -No hypothermic protocol      Diffuse cerebral edema and severe global hypoxic ischemic injury   Patient went into cardiac arrest twice, was found down for a prolonged period of time, and resuscitation was prolonged as well. MRI findings suggestive of anoxic brain injury and resulting edema.  - Neurology consulted, appreciate recs  - Will reach out to family today regarding prognosis and code status  - Palliative care consulted    Sepsis  Septic Shock, possibly 2/2 aspiration pneumonia  Elevated Cr, transaminase with severe hypotension and elevated WBC count and lactate. Radiology findings concerning for possible aspiration pneumonia (vs. pulm edema)  -Vanc and cefepime started in ED, transitioned to vanc and Zosyn  -Levo ggt, Vaso gtt  -CTA- Bilateral perihilar and peripheral areas of consolidation representing a combination of pulmonary edema, atelectasis, and possible aspiration.      Anion Gap Met Acidosis   AG of 20; bicarb 17,CO2 >100 on VBG. -Dextrose 5 fluids given  -Na bicarb given during CPR  -Pt intubated 10/3     Hyperkalemia   Pt admitted with K 6.5. Repeat K with POC.   -Daily RFP  -Calcium carbonate   -Insulin/D5 to drive K back into the cells      ANJELICA   Renal failure lactic acidosis   Cr on 2.3 baseline 1.6. Hx of UTI with ecoli.   -avoid nephrotoxic agents  -Urine studies sent out for etiology      HTN  -Hold bp medications as they are soft since cardiac arrest     Obesity BMI >60  - Unable to  patient on weight loss due to Sheeba Yeager MD, PGY-1  10/05/21  6:56 AM    This patient has been staffed and discussed with Dr. Balbir Santoyo       Patient was seen, examined and discussed with Dr. Crystal Tiwari.  I agree with the history of present illness, past medical/surgical histories, family history, social history, medication list and allergies as listed. The review of systems is as noted above. My physical exam confirms the findings listed  Chart was reviewed including labs, CXR, CT scan, EKG and medical records confirm the findings noted     S/p cardiac arrest.  Initial rhythm was asystole. Had another cardiac arrest in ED  Acute respiratory failure on mechanical vent support. , RR 20, FiO2   80%, PEEP 5  ABG 7.25/51/97. RR increased after this ABG from 16 to 20  Known non ischemic cardiomyopathy with EF of 25%  Anoxic brain injury: CT scan already showing changes of diffuse edema   Cardiogenic shock / septic shock: pressors requirement is improving. Levophed is down to 20  Aspiration pneumonia. On vanc and zosyn   ANJELICA  Tested positive for COVID19 2 weeks ago    There is no cough, no corneal, no pupillary, dolls positive, she is not on sedation. Brief SBT -> she did not breath on 30 sec SBT    She has severe anoxic injury and possibly brain dead  Family meeting at 2:00pm    Pt has a high probability of imminent or life-threatening deterioration requiring close monitoring, and highly complex decision-making and/or interventions of high intensity to assess, manipulate, and support his critical organ systems to prevent a likely inevitable decline which could occur if left untreated.      A total critical care time 45 minutes was used. This includes but not limited to examining patient, collaborating with other physicians, monitoring vital signs, telemetry, continuous pulse oximetry, and clinical response to IV medications, documentation time, review and interpretation of laboratory and radiological data, review of nursing notes and old record review.  This time excludes any time that may have been spent performing procedures for life threatening organ failure.

## 2021-10-05 NOTE — PROGRESS NOTES
Both eldest daughters at bedside, want to make face time calls with family before proceeding with end of life decisions.     Okay not draw BMP and other ordered labs per MD.

## 2021-10-06 LAB — CREATININE URINE: 7.2 MG/DL (ref 28–259)

## 2021-10-06 NOTE — PROGRESS NOTES
Order for terminal extubation placed. Patient has been removed from Mechanical Ventilation. Patient extubated and was not placed on any form of O2 due to brain death.

## 2021-10-06 NOTE — PROGRESS NOTES
[facility] notified of patient death - hold placed per their request.    Family will call in the AM with choice of  home. Nursing supervisor aware. Body prepared, lines removed, and transport notified for transport to [facility]. Patient's daughter Fidelia Sanchez took patient's purse, comfort blankets, and Holter monitor.

## 2021-10-06 NOTE — SIGNIFICANT EVENT
I personally evaluated the patient at the bedside. Patient was found to be without cardiac activity on monitors, absent brainstem reflexes, no pupillary response, pupils were fixed, dilated, no doll's eyes. Patient was areflexive with no spontaneous respirations or respiratory movements. No palpable pulses throughout. No response to painful stimuli. GCS 3. Time of death called at 19:39 on 10/5/21.     Physical Exam:  VS: No palpable pulse, no blood pressure.   Gen: Pallor with no spontaneous movement  HEENT: Pupils fixed and dilated  CVS: No heart sounds audible  Chest: No audible lung sounds  Neuro: Absent reflexes     Gerber Perez MD  PGY-1

## 2021-10-06 NOTE — PROGRESS NOTES
Brain death confirmed per Dr. Devi Ahn. Patient's two eldest daughters bedside with family on Facetime video call. Questions answered and education provided regarding extubation process. Time of death 65.

## 2021-10-08 LAB
BLOOD CULTURE, ROUTINE: NORMAL
CULTURE, BLOOD 2: NORMAL

## 2021-10-13 NOTE — PROGRESS NOTES
Physician Progress Note      Bhavani Pop  CSN #:                  [id number]  :                       1970  ADMIT DATE:       10/3/2021 5:34 PM  Padmini Parisi DATE:        10/5/2021 10:58 PM  RESPONDING  PROVIDER #:        Arnol Howe MD          QUERY TEXT:    Pt admitted 10/3- 10/5 in Cardiac arrest and per discharge summary \"initial   cardiac arrest was likely contributed to by CHF\". If possible, please   document in discharge summary further specificity regarding the type and   acuity of CHF:      The medical record reflects the following:  Risk Factors: Cardiomyopathy, HTN, medication noncompliance  Clinical Indicators: Per ICU c/s note \"Cardiac arrest 2/2 nonischemic HFrEF  -   has a significant history of CHF. PRO BNP- [id number]. As per chart review-   questionable medication compliance. Previous admission for SOB 2/2 acute CHF   on 2021; Known non ischemic cardiomyopathy with EF of 25%\". Per   Cardiology c/s Medical resident \"Acute on chronic combined systolic and   diastolic heart failure\" & Cards Attending addendum \"Cardiac arrest/chronic   systolic heart failure with recovered LV function\". ProBNP:  25,954, CTA   chest: pulmonary edema; CXR: pulmonary edema. 10/4 ECHO: ef: 60-6  Treatment: CTA chest, multiple CXR's, ECHO, Epi/ Levophed/ Vasopressin gtts  Options provided:  -- Acute on Chronic Systolic CHF/HFrEF present on admission  -- Chronic Systolic CHF/HFrEF present on admission, acute systolic CHF ruled   out  -- Other - I will add my own diagnosis  -- Disagree - Not applicable / Not valid  -- Disagree - Clinically unable to determine / Unknown  -- Refer to Clinical Documentation Reviewer    PROVIDER RESPONSE TEXT:    Cardiac arrest likely due to arrhythmia not CHF exacerbation    Query created by:  Vicenta Guzman on 10/11/2021 9:58 AM      Electronically signed by:  Arnol Howe MD 10/13/2021 1:47 PM